# Patient Record
Sex: MALE | Race: WHITE | Employment: UNEMPLOYED | URBAN - METROPOLITAN AREA
[De-identification: names, ages, dates, MRNs, and addresses within clinical notes are randomized per-mention and may not be internally consistent; named-entity substitution may affect disease eponyms.]

---

## 2017-01-15 ENCOUNTER — HOSPITAL ENCOUNTER (EMERGENCY)
Facility: CLINIC | Age: 22
Discharge: HOME | End: 2017-01-15
Attending: FAMILY MEDICINE | Admitting: FAMILY MEDICINE
Payer: COMMERCIAL

## 2017-01-15 VITALS
OXYGEN SATURATION: 100 % | HEIGHT: 69 IN | SYSTOLIC BLOOD PRESSURE: 137 MMHG | TEMPERATURE: 99 F | HEART RATE: 93 BPM | DIASTOLIC BLOOD PRESSURE: 90 MMHG | BODY MASS INDEX: 26.66 KG/M2 | RESPIRATION RATE: 14 BRPM | WEIGHT: 180 LBS

## 2017-01-15 DIAGNOSIS — E10.65 TYPE 1 DIABETES MELLITUS WITH HYPERGLYCEMIA (H): ICD-10-CM

## 2017-01-15 LAB — GLUCOSE BLDC GLUCOMTR-MCNC: 381 MG/DL (ref 70–99)

## 2017-01-15 PROCEDURE — 99283 EMERGENCY DEPT VISIT LOW MDM: CPT | Performed by: FAMILY MEDICINE

## 2017-01-15 PROCEDURE — 99283 EMERGENCY DEPT VISIT LOW MDM: CPT

## 2017-01-15 PROCEDURE — 00000146 ZZHCL STATISTIC GLUCOSE BY METER IP

## 2017-01-15 ASSESSMENT — ENCOUNTER SYMPTOMS: FEVER: 0

## 2017-01-15 NOTE — ED AVS SNAPSHOT
[facility] Emergency Department    [address] [place] [address]    Phone:  [phone]    Fax:  [phone]                                       Joaquim Lange   MRN: [id number]    Department:  [facility] Emergency Department   Date of Visit:  1/15/2017           After Visit Summary Signature Page     I have received my discharge instructions, and my questions have been answered. I have discussed any challenges I see with this plan with the nurse or doctor.    ..........................................................................................................................................  Patient/Patient Representative Signature      ..........................................................................................................................................  Patient Representative Print Name and Relationship to Patient    ..................................................               ................................................  Date                                            Time    ..........................................................................................................................................  Reviewed by Signature/Title    ...................................................              ..............................................  Date                                                            Time

## 2017-01-15 NOTE — ED AVS SNAPSHOT
[facility] Emergency Department    [address]     [place] [address]    Phone:  [phone]    Fax:  [phone]                                       Joaquim Lange   MRN: [id number]    Department:  [facility] Emergency Department   Date of Visit:  1/15/2017           Patient Information     Date Of Birth          1995        Your diagnoses for this visit were:     Type 1 diabetes mellitus with hyperglycemia (H)        You were seen by Elia Thomason MD.      Follow-up Information     Follow up with Luis Parra DO.    Specialty:  Internal Medicine    Why:  1-2 weeks    Contact information:    [address]   [place] [address] [phone]          Discharge Instructions       Continue with your insulin as directed.  Recheck in clinic with Dr. Parra in the next 1-2 weeks.  Return to the ED if worse/concerns.  It was nice visiting with both of you this evening.  I'm glad you came in now rather than waiting until you got real sick.    Thank you for choosing [facility]. We appreciate the opportunity to meet your urgent medical needs. Please let us know if we could have done anything to make your stay more satisfying.    After discharge, please closely monitor for any new or worsening symptoms. Return to the Emergency Department if you develop any acute worsening signs or symptoms.    If you had lab work, cultures or imaging studies done during your stay, the final results may still be pending. We will call you if your plan of care needs to change. However, if you are not improving as expected, please follow up with your primary care provider or clinic.     Start any prescription medications that were prescribed to you and take them as directed.     Please see additional handouts that may be pertinent to your condition.        24 Hour Appointment Hotline       To make an appointment at any [facility] clinic, call  [phone] ([phone]). If you don't have a family doctor or clinic, we will help you find one. [facility] clinics are conveniently located to serve the needs of you and your family.             Review of your medicines      CONTINUE these medicines which may have CHANGED, or have new prescriptions. If we are uncertain of the size of tablets/capsules you have at home, strength may be listed as something that might have changed.        Dose / Directions Last dose taken    insulin aspart 100 UNIT/ML injection   Commonly known as:  NovoLOG FLEXPEN   What changed:  additional instructions   Quantity:  3 mL        1 Unit /50>150 BG, 1 Unit /10gm CHO.   Refills:  0        insulin glargine 100 UNIT/ML injection   Commonly known as:  LANTUS SOLOSTAR   Dose:  20 Units   What changed:    - how much to take  - how to take this  - when to take this  - additional instructions   Quantity:  3 mL        Inject 20 Units Subcutaneous At Bedtime   Refills:  0          Our records show that you are taking the medicines listed below. If these are incorrect, please call your family doctor or clinic.        Dose / Directions Last dose taken    atorvastatin 10 MG tablet   Commonly known as:  LIPITOR   Dose:  10 mg   Quantity:  90 tablet        Take 1 tablet (10 mg) by mouth daily   Refills:  1        blood glucose monitoring lancets   Quantity:  1 Box        Use to test blood sugar 3 times daily or as directed.   Refills:  prn        * blood glucose monitoring meter device kit   Quantity:  1 kit        Use to test blood sugars 2 times daily or as directed.   Refills:  0        * blood glucose monitoring meter device kit   Commonly known as:  no brand specified   Quantity:  1 kit        Use to test blood sugar 2 times daily or as directed. One Touch ultra meter and all supplies needed.   Refills:  0        blood glucose monitoring test strip   Commonly known as:  no brand specified   Quantity:  1 Box        Use to test blood sugars 3  "times daily or as directed   Refills:  5        lisinopril 5 MG tablet   Commonly known as:  PRINIVIL/ZESTRIL   Dose:  5 mg   Quantity:  90 tablet        Take 1 tablet (5 mg) by mouth daily   Refills:  1        order for DME   Quantity:  1 Device        Equipment being ordered:       Accu check ras plus meter and supplies(strips, sol'n, lancets)  check qid   Refills:  0        pen needles 1/2\" 29G X 12MM Misc   Dose:  1 Device   Quantity:  1 Box        1 Device 4 times daily   Refills:  5        * Notice:  This list has 2 medication(s) that are the same as other medications prescribed for you. Read the directions carefully, and ask your doctor or other care provider to review them with you.            Prescriptions were sent or printed at these locations (2 Prescriptions)                   [facility]   [address] [address]    Telephone:  [phone]   Fax:  [phone]   Hours:                  These medications are not ready yet because we are checking if your insurance will help you pay for them. Call your pharmacy to confirm that your medication is ready for pickup. It may take up to 24 hours for them to receive the prescription. If the prescription is not ready within 3 business days, please contact your clinic or your provider (2 of 2)         insulin aspart (NOVOLOG FLEXPEN) 100 UNIT/ML injection               insulin glargine (LANTUS SOLOSTAR) 100 UNIT/ML injection                Procedures and tests performed during your visit     Glucose by meter      Orders Needing Specimen Collection     None      Pending Results     No orders found from 1/14/2017 to 1/16/2017.            Pending Culture Results     No orders found from 1/14/2017 to 1/16/2017.            Thank you for choosing [facility]       Thank you for choosing [facility] for your care. Our goal is always to provide you with excellent care. Hearing back from our patients is one way we can continue to " "improve our services. Please take a few minutes to complete the written survey that you may receive in the mail after you visit with us. Thank you!        [employer] Information     [employer] lets you send messages to your doctor, view your test results, renew your prescriptions, schedule appointments and more. To sign up, go to [email or web address] . Click on \"Log in\" on the left side of the screen, which will take you to the Welcome page. Then click on \"Sign up Now\" on the right side of the page.     You will be asked to enter the access code listed below, as well as some personal information. Please follow the directions to create your username and password.     Your access code is: [email or web address]  Expires: 4/15/2017  8:58 PM     Your access code will  in 90 days. If you need help or a new code, please call your [facility] clinic or [phone].        Care EveryWhere ID     This is your Care EveryWhere ID. This could be used by other organizations to access your [facility] medical records  [id number]        After Visit Summary       This is your record. Keep this with you and show to your community pharmacist(s) and doctor(s) at your next visit.                  "

## 2017-01-16 NOTE — ED NOTES
Patient has been running high today but he held off eating until tonight so he wouldn't be too high. He is not having any symptoms of being in DKA but he is feeling high.

## 2017-01-16 NOTE — ED PROVIDER NOTES
History     Chief Complaint   Patient presents with     Medication Refill     The history is provided by the patient.     Joaquim Lange is a 21 year old male who presents to the ED with a medication refill. Patient is a diabetic and ran out of his Lantus last night and Novalog this morning. He has ran out because he is switching from [employer] insurance to his work insurance. His primary care provider is Dr. Parra.     He has been diabetic since age 6.  Takes Lantus 20 units at bedtime and NovoLog 1 unit / 50 greater than 150 BG and 1 unit per 10 g of carbohydrates.  He ran out of his Lantus last night and his NovoLog this morning.  Fortunately he came in now rather than waiting a few days and ending up in DKA.  He has not been ill as of late.  He feels like his blood sugars a bit high but otherwise feels fine.      Past Medical History   Diagnosis Date     Type I (juvenile type) diabetes mellitus without mention of complication, not stated as uncontrolled 07/03/02     sent down to [facility] for admission and evaluation       Past Surgical History   Procedure Laterality Date     No history of surgery         Social History     Social History     Marital Status: Single     Spouse Name: N/A     Number of Children: N/A     Years of Education: N/A     Occupational History     Not on file.     Social History Main Topics     Smoking status: Current Every Day Smoker -- 0.50 packs/day for 3 years     Types: Cigarettes     Smokeless tobacco: Never Used      Comment: started age 17     Alcohol Use: No     Drug Use: No     Sexual Activity: Not on file     Other Topics Concern     Not on file     Social History Narrative          Allergies   Allergen Reactions     No Known Drug Allergies        Med List Reviewed      I have reviewed the Medications, Allergies, Past Medical and Surgical History, and Social History in the Epic system.    Review of Systems   Constitutional: Negative for fever.   All other systems  "reviewed and are negative.      Physical Exam   BP: 137/90 mmHg  Pulse: 93  Temp: 99  F (37.2  C)  Resp: 14  Height: 175.3 cm (5' 9\")  Weight: 81.647 kg (180 lb)  SpO2: 100 %  Physical Exam   Constitutional: He is oriented to person, place, and time. He appears well-developed and well-nourished. No distress.   Pulmonary/Chest: Effort normal. No respiratory distress.   Neurological: He is alert and oriented to person, place, and time.   Psychiatric: He has a normal mood and affect.       ED Course    Finger stick blood glucose was 381.  He's missed a couple of doses of his NovoLog today and feels comfortable getting back on his insulin as he's done this for many years.  I refilled both the NovoLog and Lantus for him.  I asked him to follow up with Dr. Parra in the next 1-2 weeks.     Procedures          Results for orders placed or performed during the hospital encounter of 01/15/17 (from the past 24 hour(s))   Glucose by meter   Result Value Ref Range    Glucose 381 (H) 70 - 99 mg/dL     Medications - No data to display      Assessments & Plan (with Medical Decision Making)    (E10.65) Type 1 diabetes mellitus with hyperglycemia (H)  Comment:   Plan: insulin aspart (NOVOLOG FLEXPEN) 100 UNIT/ML         injection, insulin glargine (LANTUS SOLOSTAR)         100 UNIT/ML injection                I have reviewed the nursing notes.    I have reviewed the findings, diagnosis, plan and need for follow up with the patient.    New Prescriptions    No medications on file       Final diagnoses:   Type 1 diabetes mellitus with hyperglycemia (H)   This document serves as a record of services personally performed by Elia Thomason MD. It was created on their behalf by Sylvia Kwon, a trained medical scribe. The creation of this record is based on the provider's personal observations and the statements of the patient. This document has been checked and approved by the attending provider.   Note: Chart documentation " done in part with Dragon Voice Recognition software. Although reviewed after completion, some word and grammatical errors may remain.        1/15/2017   [facility] EMERGENCY DEPARTMENT      Elia Thomason MD  01/15/17 0446

## 2017-01-16 NOTE — ED NOTES
Pt is out of Lantus and Novalog because he went from [employer] to work insurance and there has been a lapse.

## 2017-01-16 NOTE — DISCHARGE INSTRUCTIONS
Continue with your insulin as directed.  Recheck in clinic with Dr. Parra in the next 1-2 weeks.  Return to the ED if worse/concerns.  It was nice visiting with both of you this evening.  I'm glad you came in now rather than waiting until you got real sick.    Thank you for choosing [facility]. We appreciate the opportunity to meet your urgent medical needs. Please let us know if we could have done anything to make your stay more satisfying.    After discharge, please closely monitor for any new or worsening symptoms. Return to the Emergency Department if you develop any acute worsening signs or symptoms.    If you had lab work, cultures or imaging studies done during your stay, the final results may still be pending. We will call you if your plan of care needs to change. However, if you are not improving as expected, please follow up with your primary care provider or clinic.     Start any prescription medications that were prescribed to you and take them as directed.     Please see additional handouts that may be pertinent to your condition.

## 2017-02-02 ENCOUNTER — HOSPITAL ENCOUNTER (EMERGENCY)
Facility: CLINIC | Age: 22
Discharge: HOME | End: 2017-02-02
Attending: PHYSICIAN ASSISTANT | Admitting: PHYSICIAN ASSISTANT
Payer: COMMERCIAL

## 2017-02-02 ENCOUNTER — OFFICE VISIT (OUTPATIENT)
Dept: FAMILY MEDICINE | Facility: CLINIC | Age: 22
End: 2017-02-02

## 2017-02-02 VITALS
RESPIRATION RATE: 16 BRPM | SYSTOLIC BLOOD PRESSURE: 143 MMHG | TEMPERATURE: 98.6 F | DIASTOLIC BLOOD PRESSURE: 88 MMHG | OXYGEN SATURATION: 98 %

## 2017-02-02 DIAGNOSIS — E10.65 TYPE 1 DIABETES MELLITUS WITH HYPERGLYCEMIA (H): ICD-10-CM

## 2017-02-02 DIAGNOSIS — Z53.9 ERRONEOUS ENCOUNTER--DISREGARD: Primary | ICD-10-CM

## 2017-02-02 PROCEDURE — 99283 EMERGENCY DEPT VISIT LOW MDM: CPT | Performed by: PHYSICIAN ASSISTANT

## 2017-02-02 PROCEDURE — 00000146 ZZHCL STATISTIC GLUCOSE BY METER IP

## 2017-02-02 PROCEDURE — 99283 EMERGENCY DEPT VISIT LOW MDM: CPT

## 2017-02-02 NOTE — ED AVS SNAPSHOT
[facility] Emergency Department    [address] [place] [address]    Phone:  [phone]    Fax:  [phone]                                       Joaquim Lange   MRN: [id number]    Department:  [facility] Emergency Department   Date of Visit:  2/2/2017           After Visit Summary Signature Page     I have received my discharge instructions, and my questions have been answered. I have discussed any challenges I see with this plan with the nurse or doctor.    ..........................................................................................................................................  Patient/Patient Representative Signature      ..........................................................................................................................................  Patient Representative Print Name and Relationship to Patient    ..................................................               ................................................  Date                                            Time    ..........................................................................................................................................  Reviewed by Signature/Title    ...................................................              ..............................................  Date                                                            Time

## 2017-02-02 NOTE — ED AVS SNAPSHOT
[facility] Emergency Department    [address] [place] [address]    Phone:  [phone]    Fax:  [phone]                                       Joaquim Lange   MRN: [id number]    Department:  [facility] Emergency Department   Date of Visit:  2/2/2017           Patient Information     Date Of Birth          1995        Your diagnoses for this visit were:     Type 1 diabetes mellitus with hyperglycemia (H)        You were seen by Yobani Fernandez PA-C.        Discharge Instructions       1.  Please keep your appointment tomorrow with Dr. Parra!!  This is absolutely necessary!!    It was a pleasure meeting you this evening.      24 Hour Appointment Hotline       To make an appointment at any [facility] clinic, call [phone] ([phone]). If you don't have a family doctor or clinic, we will help you find one. [facility] clinics are conveniently located to serve the needs of you and your family.             Review of your medicines      START taking        Dose / Directions Last dose taken    insulin lispro 100 UNIT/ML injection   Commonly known as:  HumaLOG KWIKpen   Quantity:  3 mL   Replaces:  insulin aspart 100 UNIT/ML injection        1 Unit /50>150 BG, 1 Unit /10gm CHO.   Refills:  0          Our records show that you are taking the medicines listed below. If these are incorrect, please call your family doctor or clinic.        Dose / Directions Last dose taken    blood glucose monitoring lancets   Quantity:  1 Box        Use to test blood sugar 3 times daily or as directed.   Refills:  prn        * blood glucose monitoring meter device kit   Quantity:  1 kit        Use to test blood sugars 2 times daily or as directed.   Refills:  0        * blood glucose monitoring meter device kit   Commonly known as:  no brand specified   Quantity:  1 kit        Use to test blood sugar 2 times daily or as directed. One Touch ultra meter and all supplies needed.   Refills:   "0        blood glucose monitoring test strip   Commonly known as:  no brand specified   Quantity:  1 Box        Use to test blood sugars 3 times daily or as directed   Refills:  5        insulin glargine 100 UNIT/ML injection   Commonly known as:  LANTUS SOLOSTAR   Dose:  20 Units   Quantity:  3 mL        Inject 20 Units Subcutaneous At Bedtime   Refills:  0        order for DME   Quantity:  1 Device        Equipment being ordered:       Accu check ras plus meter and supplies(strips, sol'n, lancets)  check qid   Refills:  0        pen needles 1/2\" 29G X 12MM Misc   Dose:  1 Device   Quantity:  1 Box        1 Device 4 times daily   Refills:  5        * Notice:  This list has 2 medication(s) that are the same as other medications prescribed for you. Read the directions carefully, and ask your doctor or other care provider to review them with you.      STOP taking        Dose Reason for stopping Comments    insulin aspart 100 UNIT/ML injection   Commonly known as:  NovoLOG FLEXPEN   Replaced by:  insulin lispro 100 UNIT/ML injection                      Prescriptions were sent or printed at these locations (2 Prescriptions)                   [facility] [address]    [address] [facility] [place] [address]    Telephone:  [phone]   Fax:  [phone]   Hours:                  E-Prescribed (2 of 2)         insulin lispro (HUMALOG KWIKPEN) 100 UNIT/ML injection               insulin glargine (LANTUS SOLOSTAR) 100 UNIT/ML injection                Orders Needing Specimen Collection     None      Pending Results     No orders found from 2/1/2017 to 2/3/2017.            Pending Culture Results     No orders found from 2/1/2017 to 2/3/2017.            Thank you for choosing [facility]       Thank you for choosing [facility] for your care. Our goal is always to provide you with excellent care. Hearing back from our patients is one way we can continue to improve our services. Please take a " "few minutes to complete the written survey that you may receive in the mail after you visit with us. Thank you!        [employer] Information     [employer] lets you send messages to your doctor, view your test results, renew your prescriptions, schedule appointments and more. To sign up, go to [email or web address] . Click on \"Log in\" on the left side of the screen, which will take you to the Welcome page. Then click on \"Sign up Now\" on the right side of the page.     You will be asked to enter the access code listed below, as well as some personal information. Please follow the directions to create your username and password.     Your access code is: [email or web address]  Expires: 4/15/2017  8:58 PM     Your access code will  in 90 days. If you need help or a new code, please call your [facility] clinic or [phone].        Care EveryWhere ID     This is your Care EveryWhere ID. This could be used by other organizations to access your [facility] medical records  [id number]        After Visit Summary       This is your record. Keep this with you and show to your community pharmacist(s) and doctor(s) at your next visit.                  "

## 2017-02-03 NOTE — DISCHARGE INSTRUCTIONS
1.  Please keep your appointment tomorrow with Dr. Parra!!  This is absolutely necessary!!    It was a pleasure meeting you this evening.

## 2017-02-03 NOTE — ED PROVIDER NOTES
History     Chief Complaint   Patient presents with     Blood Sugar Problem     The history is provided by the patient.     Joaquim Lange is a 21 year old male with Type I diabetes who presents to the emergency department with a blood sugar problem. Patient has had Type I diabetes since he was 6 years old. He states that he does not have insulin at home currently and needs more. He says he recently acquired insurance through work and made a clinic visit today to receive more insulin. Patient went to the clinic but when he got there he was told he cannot be seen because there was a problem with scheduling and the provider was over booked and needed to run labs in order to prescribe him insulin. The patient then came to the ED to receive insulin. He says he has not had insurance for a year and had a family member providing insulin for him over the year. He says his blood sugar levels have been around the 200's. At 1400 today, it was 220 and at 1810 in the ED it was 243. He states that if he would guess his A1C it would be 9. Patient denies vomiting. He notes he has a clinic appointment scheduled for tomorrow that he states he will be going to since he now has insurance.    I have reviewed the Medications, Allergies, Past Medical and Surgical History, and Social History in the Epic system.      Patient Active Problem List   Diagnosis     Type 1 diabetes mellitus with hyperglycemia (H)     CARDIOVASCULAR SCREENING; LDL GOAL LESS THAN 100     Past Medical History   Diagnosis Date     Type I (juvenile type) diabetes mellitus without mention of complication, not stated as uncontrolled 07/03/02     sent down to [facility] for admission and evaluation       Past Surgical History   Procedure Laterality Date     No history of surgery         Family History   Problem Relation Age of Onset     DIABETES Father      Type I since childhood     DIABETES Maternal Aunt      Type II       Social History   Substance Use  "Topics     Smoking status: Current Every Day Smoker -- 0.75 packs/day for 3 years     Types: Cigarettes     Smokeless tobacco: Never Used      Comment: started age 17     Alcohol Use: No        Immunization History   Administered Date(s) Administered     DTAP (<7y) 08/29/2001     HIB 05/02/1997     Hepatitis B 1995, 1996, 02/12/1997     IPV 08/29/2001     MMR 08/29/2001     OPV 1996, 1996, 1996     Tetramune (DtP/HIB) 1996, 1996, 1996     Varicella 05/02/1997          Allergies   Allergen Reactions     No Known Drug Allergies        Current Outpatient Prescriptions   Medication Sig Dispense Refill     insulin glargine (LANTUS SOLOSTAR) 100 UNIT/ML injection Inject 20 Units Subcutaneous At Bedtime 3 mL 0     insulin aspart (NOVOLOG FLEXPEN) 100 UNIT/ML injection 1 Unit /50>150 BG, 1 Unit /10gm CHO. 3 mL 0     [DISCONTINUED] insulin lispro (HUMALOG KWIKPEN) 100 UNIT/ML injection 1 Unit /50>150 BG, 1 Unit /10gm CHO. 3 mL 0     blood glucose monitoring (ACCU-CHEK MULTICLIX) lancets Use to test blood sugar 3 times daily or as directed. 1 Box prn     blood glucose monitoring (NO BRAND SPECIFIED) test strip Use to test blood sugars 3 times daily or as directed 1 Box 5     order for DME Equipment being ordered:       Accu check ras plus meter and supplies(strips, sol'n, lancets)   check qid 1 Device 0     blood glucose monitoring (NO BRAND SPECIFIED) meter device kit Use to test blood sugar 2 times daily or as directed. One Touch ultra meter and all supplies needed. 1 kit 0     Insulin Pen Needle (PEN NEEDLES 1/2\") 29G X 12MM MISC 1 Device 4 times daily 1 Box 5     blood glucose monitoring (JIA CONTOUR MONITOR) meter device kit Use to test blood sugars 2 times daily or as directed. 1 kit 0     Review of Systems   All other systems reviewed and are negative.      Physical Exam   BP: 143/88 mmHg  Heart Rate: 79  Temp: 98.6  F (37  C)  Resp: 16  SpO2: 98 %  Physical Exam "   Constitutional: He is oriented to person, place, and time. He appears well-developed and well-nourished.   HENT:   Head: Normocephalic and atraumatic.   Eyes: Conjunctivae and EOM are normal.   Neck: Normal range of motion.   Musculoskeletal: Normal range of motion.   Neurological: He is alert and oriented to person, place, and time.   Skin: Skin is warm and dry.   Psychiatric: He has a normal mood and affect. His behavior is normal.   Nursing note and vitals reviewed.      ED Course   Procedures             Critical Care time:  none             No results found for this or any previous visit (from the past 24 hour(s)).    Medications - No data to display    Assessments & Plan (with Medical Decision Making)  Type 1 diabetes mellitus with hyperglycemia (H)       21 year old male with a history of type 1 diabetes since 6 years old presents for evaluation of running out of his home insulin medication. He states that he has not had insurance for over 1.5 years, and therefore has not been able to see his regular primary care physician for follow-up of his diabetes condition. He has been using insulin from an old prescription, and also from a friend to help manage his condition. He states his blood sugars are averaging below 200 range, and he thinks that his hemoglobin A1c would be in the 9 range. Blood sugar in the ED was 243. She has no history or exam findings suggestive of DKA. The patient has a scheduled appointment with his primary care physician tomorrow for full evaluation and laboratory workup. I think it is appropriate to provide him with his 2 insulin prescriptions to make it to that appointment. She was very grateful for this. Initially, we sent the prescriptions to the clinic pharmacy, but they are only able to dispense the medication in 5 insulin pen boxes, which would be close to a 3 month supply. Therefore, we changed this prescription to the inpatient pharmacy as they are able to dispense just one pen  at a time. I wanted to make sure that the patient followed up with primary care to get the appropriate laboratory monitoring performed given that he is long overdue for this. Patient did agree to follow-up the following day as scheduled. We discussed that she cannot continue to get refills of his insulin in the ED setting, and they does need to reestablish his care with primary care again. The patient was in agreement with this plan and was suitable for discharge.      I have reviewed the nursing notes.    I have reviewed the findings, diagnosis, plan and need for follow up with the patient.    Discharge Medication List as of 2/2/2017  6:39 PM      START taking these medications    Details   insulin lispro (HUMALOG KWIKPEN) 100 UNIT/ML injection 1 Unit /50>150 BG, 1 Unit /10gm CHO., Disp-3 mL, R-0, E-Prescribe             Final diagnoses:   Type 1 diabetes mellitus with hyperglycemia (H)   This document serves as a record of services personally performed by Yobani Fernandez PA*. It was created on their behalf by Carla Lemus, a trained medical scribe. The creation of this record is based on the provider's personal observations and the statements of the patient. This document has been checked and approved by the attending provider.     Note: Chart documentation done in part with Dragon Voice Recognition software. Although reviewed after completion, some word and grammatical errors may remain.    2/2/2017   Yobani Fernandez PA-C   [facility] EMERGENCY DEPARTMENT      Yobani Fernandez PA-C  02/03/17 0023

## 2017-02-10 DIAGNOSIS — E10.65 TYPE 1 DIABETES MELLITUS WITH HYPERGLYCEMIA (H): Primary | ICD-10-CM

## 2017-02-10 NOTE — TELEPHONE ENCOUNTER
Patient called as he is out of his insulin and needs some today. Per Dr. Wiggins ok to refill insulin until he can get in to follow up. Patient scheduled with Dr. Parra and is aware his appointment is in [place]. JUNG/MA

## 2017-02-13 ENCOUNTER — DOCUMENTATION ONLY (OUTPATIENT)
Dept: FAMILY MEDICINE | Facility: CLINIC | Age: 22
End: 2017-02-13

## 2017-02-16 LAB — GLUCOSE BLDC GLUCOMTR-MCNC: 243 MG/DL (ref 70–99)

## 2017-02-17 ENCOUNTER — OFFICE VISIT (OUTPATIENT)
Dept: FAMILY MEDICINE | Facility: OTHER | Age: 22
End: 2017-02-17
Payer: COMMERCIAL

## 2017-02-17 VITALS
BODY MASS INDEX: 25.1 KG/M2 | WEIGHT: 170 LBS | SYSTOLIC BLOOD PRESSURE: 122 MMHG | HEART RATE: 99 BPM | OXYGEN SATURATION: 100 % | DIASTOLIC BLOOD PRESSURE: 82 MMHG | TEMPERATURE: 98.2 F

## 2017-02-17 DIAGNOSIS — E10.65 TYPE 1 DIABETES MELLITUS WITH HYPERGLYCEMIA (H): Primary | ICD-10-CM

## 2017-02-17 DIAGNOSIS — B07.0 PLANTAR WARTS: ICD-10-CM

## 2017-02-17 LAB
ANION GAP SERPL CALCULATED.3IONS-SCNC: 7 MMOL/L (ref 3–14)
BUN SERPL-MCNC: 18 MG/DL (ref 7–30)
CALCIUM SERPL-MCNC: 9.2 MG/DL (ref 8.5–10.1)
CHLORIDE SERPL-SCNC: 101 MMOL/L (ref 94–109)
CHOLEST SERPL-MCNC: 141 MG/DL
CO2 SERPL-SCNC: 31 MMOL/L (ref 20–32)
CREAT SERPL-MCNC: 0.67 MG/DL (ref 0.66–1.25)
CREAT UR-MCNC: 28 MG/DL
GFR SERPL CREATININE-BSD FRML MDRD: ABNORMAL ML/MIN/1.7M2
GLUCOSE SERPL-MCNC: 373 MG/DL (ref 70–99)
HBA1C MFR BLD: 10.9 % (ref 4.3–6)
HDLC SERPL-MCNC: 56 MG/DL
LDLC SERPL CALC-MCNC: 58 MG/DL
MICROALBUMIN UR-MCNC: <5 MG/L
MICROALBUMIN/CREAT UR: NORMAL MG/G CR (ref 0–17)
NONHDLC SERPL-MCNC: 85 MG/DL
POTASSIUM SERPL-SCNC: 4.3 MMOL/L (ref 3.4–5.3)
SODIUM SERPL-SCNC: 139 MMOL/L (ref 133–144)
TRIGL SERPL-MCNC: 134 MG/DL

## 2017-02-17 PROCEDURE — 99214 OFFICE O/P EST MOD 30 MIN: CPT | Performed by: INTERNAL MEDICINE

## 2017-02-17 PROCEDURE — 80061 LIPID PANEL: CPT | Performed by: INTERNAL MEDICINE

## 2017-02-17 PROCEDURE — 99207 C FOOT EXAM  NO CHARGE: CPT | Performed by: INTERNAL MEDICINE

## 2017-02-17 PROCEDURE — 82043 UR ALBUMIN QUANTITATIVE: CPT | Performed by: INTERNAL MEDICINE

## 2017-02-17 PROCEDURE — 80048 BASIC METABOLIC PNL TOTAL CA: CPT | Performed by: INTERNAL MEDICINE

## 2017-02-17 PROCEDURE — 83036 HEMOGLOBIN GLYCOSYLATED A1C: CPT | Performed by: INTERNAL MEDICINE

## 2017-02-17 PROCEDURE — 36415 COLL VENOUS BLD VENIPUNCTURE: CPT | Performed by: INTERNAL MEDICINE

## 2017-02-17 RX ORDER — INSULIN ASPART 100 [IU]/ML
INJECTION, SOLUTION INTRAVENOUS; SUBCUTANEOUS
COMMUNITY
Start: 2017-02-02 | End: 2017-02-17

## 2017-02-17 RX ORDER — INSULIN GLARGINE 100 [IU]/ML
20 INJECTION, SOLUTION SUBCUTANEOUS AT BEDTIME
Qty: 20 ML | Refills: 1 | Status: SHIPPED | OUTPATIENT
Start: 2017-02-17 | End: 2017-03-10

## 2017-02-17 RX ORDER — INSULIN ASPART 100 [IU]/ML
60 INJECTION, SOLUTION INTRAVENOUS; SUBCUTANEOUS DAILY
Qty: 15 ML | Refills: 3 | Status: SHIPPED | OUTPATIENT
Start: 2017-02-17 | End: 2017-02-17

## 2017-02-17 ASSESSMENT — PAIN SCALES - GENERAL: PAINLEVEL: SEVERE PAIN (7)

## 2017-02-17 NOTE — PROGRESS NOTES
SUBJECTIVE:                                                    Joaquim Lange is a 21 year old male who presents to clinic today for the following health issues:    Diabetes Follow-up    Patient is checking blood sugars: four times daily.    Blood sugar testing frequency justification: Uncontrolled diabetes  Results are as follows:         150-220    Diabetic concerns: None     Symptoms of hypoglycemia (low blood sugar): none     Paresthesias (numbness or burning in feet) or sores: No     Date of last diabetic eye exam: none       Amount of exercise or physical activity: 4-5 days/week for an average of 30-45 minutes    Problems taking medications regularly: No    Medication side effects: none  Diet: carbohydrate counting      Problem list and histories reviewed & adjusted, as indicated.  Additional history: none      CHIEF COMPLAINT:    The patient is a pleasant 21-year-old male who presents today for follow-up on his type 1 diabetes. He was last evaluated in November 2015. He notes that he has been busy. He is running out of insulin. He notes his blood sugars have not been controlled. Values are generally around 200-220. He does not take an ACE inhibitor or a statin at this point. He is refusing. Additionally, he has a wart on the bottom of his foot and would like referral to podiatry. With respect to his diabetes, denies any ulceration, skin breakdown, neuropathy, visual changes, or symptoms of uncontrolled hyperglycemia.                         PAST, FAMILY,SOCIAL HISTORY:     Medical  History:   has a past medical history of Type I (juvenile type) diabetes mellitus without mention of complication, not stated as uncontrolled (07/03/02).     Surgical History:   has a past surgical history that includes no history of surgery.     Social History:   reports that he has been smoking Cigarettes.  He has a 2.25 pack-year smoking history. He has never used smokeless tobacco. He reports that he does not drink alcohol  "or use illicit drugs.     Family History:  family history includes DIABETES in his father and maternal aunt.            MEDICATIONS  Current Outpatient Prescriptions   Medication Sig Dispense Refill     insulin glargine (LANTUS VIAL) 100 UNIT/ML injection Inject 20 Units Subcutaneous At Bedtime 20 mL 1     Insulin Pen Needle (PEN NEEDLES 1/2\") 29G X 12MM MISC 1 Device 4 times daily 100 each 3     insulin lispro (HUMALOG KWIKPEN) 100 UNIT/ML injection 60 units per day max in divided doses per sliding scale 30 mL 1     blood glucose monitoring (ACCU-CHEK MULTICLIX) lancets Use to test blood sugar 3 times daily or as directed. 1 Box prn     blood glucose monitoring (NO BRAND SPECIFIED) test strip Use to test blood sugars 3 times daily or as directed 1 Box 5     order for DME Equipment being ordered:       Accu check ras plus meter and supplies(strips, sol'n, lancets)   check qid 1 Device 0     blood glucose monitoring (NO BRAND SPECIFIED) meter device kit Use to test blood sugar 2 times daily or as directed. One Touch ultra meter and all supplies needed. 1 kit 0     blood glucose monitoring (nanoRETE CONTOUR MONITOR) meter device kit Use to test blood sugars 2 times daily or as directed. 1 kit 0         --------------------------------------------------------------------------------------------------------------------                          REVIEW OF SYSTEMS:         LUNGS: Pt denies: cough,excess sputum, hemoptysis, or shortness of breath.   HEART: Pt denies: chest pain, arrythmia, syncope, tachy or bradyarrhythmia or excess edema.   GI: Pt denies: nausea, vomitting, diarrhea, constipation, melena, or hematochezia.   NEURO: Pt denies: seizures, strokes, diplopia, weakness, paraesthesias, or paralysis.   SKIN: Pt denies: itching, rashes, discoloration, or additional lesions of concern. Denies recent hair loss. He does have a plantar wart on his foot.   PSYCH: The patient denies significant depression, anxiety, mood " "imbalance. Specifically denies any suicidal ideation.                          EXAMINATION:         /82 (BP Location: Left arm, Patient Position: Chair, Cuff Size: Adult Large)  Pulse 99  Temp 98.2  F (36.8  C) (Temporal)  Wt 170 lb (77.1 kg)  SpO2 100%  BMI 25.1 kg/m2   Constitutional: The patient appears to be in no acute distress. The patient appears to be adequately hydrated. No acute respiratory or hemodynamic distress is noted at this time.   LUNGS: clear bilaterally, airflow is brisk, no intercostal retraction or stridor is noted. No coughing is noted during visit.   HEART:  regular without rubs, clicks, gallops, or murmurs. PMI is nondisplaced. Upstrokes are brisk. S1,S2 are heard.   GI: Abdomen is soft, without rebound, guarding or tenderness. Bowel sounds are appropriate. No renal bruits are heard.    NEURO: Pt is alert and appropriate. No neurologic lateralization is noted. Cranial nerves 2-12 are intact. Peripheral sensory and motor function are grossly normal   SKIN:  warm and dry. No erythema, or rashes are noted. No specific lesions of concern are noted.   A noninfected/noninflamed plantar wart with a significantly deep \"work\". It is noted on the plantar aspect of his foot. It is on the right foot and at the lateral midfoot.   PSYCH: The patient appears grossly appropriate. Maintains good eye contact, does not have any jittery or atypical motion. Displays appropriate affect.                        DECISION MAKIN. Type 1 diabetes mellitus with hyperglycemia (H)  Discussed compliance,  Discussed statins and ACE inhibitor's,  Discussed baby aspirin daily,  Discussed compliance again.      - insulin glargine (LANTUS VIAL) 100 UNIT/ML injection; Inject 20 Units Subcutaneous At Bedtime  Dispense: 20 mL; Refill: 1  - Hemoglobin A1c  - Basic metabolic panel  - Lipid Profile  - Albumin Random Urine Quantitative  - PODIATRY/FOOT & ANKLE SURGERY REFERRAL  - Insulin Pen Needle (PEN NEEDLES /\") 29G " X 12MM MISC; 1 Device 4 times daily  Dispense: 100 each; Refill: 3  - insulin lispro (HUMALOG KWIKPEN) 100 UNIT/ML injection; 60 units per day max in divided doses per sliding scale  Dispense: 30 mL; Refill: 1    2. Plantar warts    - FOOT EXAM  - PODIATRY/FOOT & ANKLE SURGERY REFERRAL                           FOLLOW UP    I have asked the patient to make an appointment for follow up with me in 2 weeks to review the results and discuss treatment options for his diabetic control.    I have carefully explained the diagnosis and treatment options with the patient. The patient has displayed an understanding of the above, and all subsequent questions were answered.       DO ZAFAR Guerra    Portions of this note were produced using [employer]  Although every attempt at real-time proof reading has been made, occasional grammar/syntax errors may have been missed.

## 2017-02-17 NOTE — NURSING NOTE
"Chief Complaint   Patient presents with     Diabetes       Initial /82 (BP Location: Left arm, Patient Position: Chair, Cuff Size: Adult Large)  Pulse 99  Temp 98.2  F (36.8  C) (Temporal)  Wt 170 lb (77.1 kg)  SpO2 100%  BMI 25.1 kg/m2 Estimated body mass index is 25.1 kg/(m^2) as calculated from the following:    Height as of 1/15/17: 5' 9\" (1.753 m).    Weight as of this encounter: 170 lb (77.1 kg).  Medication Reconciliation: complete   Mera IRENEA    "

## 2017-02-17 NOTE — MR AVS SNAPSHOT
After Visit Summary   2/17/2017    Joaquim Lange    MRN: [id number]           Patient Information     Date Of Birth          1995        Visit Information        Provider Department      2/17/2017 1:40 PM Luis Parra DO [facility]        Today's Diagnoses     Type 1 diabetes mellitus with hyperglycemia (H)    -  1    Plantar warts           Follow-ups after your visit        Additional Services     PODIATRY/FOOT & ANKLE SURGERY REFERRAL       Your provider has referred you to: [facility]: [facility] [phone]   [email or web address]    Please be aware that coverage of these services is subject to the terms and limitations of your health insurance plan.  Call member services at your health plan with any benefit or coverage questions.      Please bring the following to your appointment:  >>   Any x-rays, CTs or MRIs which have been performed.  Contact the facility where they were done to arrange for  prior to your scheduled appointment.    >>   List of current medications   >>   This referral request   >>   Any documents/labs given to you for this referral                  Your next 10 appointments already scheduled     Mar 03, 2017 11:30 AM CST   [facility]    [address] [address] [phone]              Who to contact     If you have questions or need follow up information about today's clinic visit or your schedule please contact [facility] directly at [phone].  Normal or non-critical lab and imaging results will be communicated to you by MyChart, letter or phone within 4 business days after the clinic has received the results. If you do not hear from us within 7 days, please contact the clinic through MyChart or phone. If you have a critical or abnormal lab result, we will notify you by phone  "as soon as possible.  Submit refill requests through [employer] or call your pharmacy and they will forward the refill request to us. Please allow 3 business days for your refill to be completed.          Additional Information About Your Visit        [employer] Information     [employer] lets you send messages to your doctor, view your test results, renew your prescriptions, schedule appointments and more. To sign up, go to [email or web address] . Click on \"Log in\" on the left side of the screen, which will take you to the Welcome page. Then click on \"Sign up Now\" on the right side of the page.     You will be asked to enter the access code listed below, as well as some personal information. Please follow the directions to create your username and password.     Your access code is: [email or web address]  Expires: 4/15/2017  8:58 PM     Your access code will  in 90 days. If you need help or a new code, please call your [place] clinic or [phone].        Care EveryWhere ID     This is your Care EveryWhere ID. This could be used by other organizations to access your [place] medical records  [id number]        Your Vitals Were     Pulse Temperature Pulse Oximetry BMI (Body Mass Index)          99 98.2  F (36.8  C) (Temporal) 100% 25.1 kg/m2         Blood Pressure from Last 3 Encounters:   17 122/82   17 143/88   01/15/17 137/90    Weight from Last 3 Encounters:   17 170 lb (77.1 kg)   01/15/17 180 lb (81.6 kg)   11/04/15 180 lb 9.6 oz (81.9 kg) (82 %)*     * Growth percentiles are based on CDC 2-20 Years data.              We Performed the Following     Albumin Random Urine Quantitative     Basic metabolic panel     FOOT EXAM     Hemoglobin A1c     Lipid Profile     PODIATRY/FOOT & ANKLE SURGERY REFERRAL          Today's Medication Changes          These changes are accurate as of: 17  2:37 PM.  If you have any questions, ask your nurse or doctor.               Start taking these medicines.        " "Dose/Directions    insulin glargine 100 UNIT/ML injection   Commonly known as:  LANTUS VIAL   Used for:  Type 1 diabetes mellitus with hyperglycemia (H)   Started by:  Luis Parra DO        Dose:  20 Units   Inject 20 Units Subcutaneous At Bedtime   Quantity:  20 mL   Refills:  1       insulin lispro 100 UNIT/ML injection   Commonly known as:  HumaLOG KWIKpen   Used for:  Type 1 diabetes mellitus with hyperglycemia (H)   Started by:  Luis Parra DO        60 units per day max in divided doses per sliding scale   Quantity:  30 mL   Refills:  1         Stop taking these medicines if you haven't already. Please contact your care team if you have questions.     NovoLOG FLEXPEN 100 UNIT/ML injection   Generic drug:  insulin aspart   Stopped by:  Luis Parra DO                Where to get your medicines      These medications were sent to [facility] [place] [id number] [facility] [address]     Phone:  [phone]     insulin glargine 100 UNIT/ML injection    insulin lispro 100 UNIT/ML injection    pen needles 1/2\" 29G X 12MM Misc                Primary Care Provider Office Phone # Fax #    Luis Alden Parra [phone]       [address] [place] [address]        Thank you!     Thank you for choosing [facility]  for your care. Our goal is always to provide you with excellent care. Hearing back from our patients is one way we can continue to improve our services. Please take a few minutes to complete the written survey that you may receive in the mail after your visit with us. Thank you!             Your Updated Medication List - Protect others around you: Learn how to safely use, store and throw away your medicines at www.disposemymeds.org.          This list is accurate as of: 2/17/17  2:37 PM.  Always use your most recent med list.                   Brand Name " "Dispense Instructions for use    blood glucose monitoring lancets     1 Box    Use to test blood sugar 3 times daily or as directed.       * blood glucose monitoring meter device kit     1 kit    Use to test blood sugars 2 times daily or as directed.       * blood glucose monitoring meter device kit    no brand specified    1 kit    Use to test blood sugar 2 times daily or as directed. One Touch ultra meter and all supplies needed.       blood glucose monitoring test strip    no brand specified    1 Box    Use to test blood sugars 3 times daily or as directed       insulin glargine 100 UNIT/ML injection    LANTUS VIAL    20 mL    Inject 20 Units Subcutaneous At Bedtime       insulin lispro 100 UNIT/ML injection    HumaLOG KWIKpen    30 mL    60 units per day max in divided doses per sliding scale       order for DME     1 Device    Equipment being ordered:       Accu check ras plus meter and supplies(strips, sol'n, lancets)  check qid       pen needles 1/2\" 29G X 12MM Misc     100 each    1 Device 4 times daily       * Notice:  This list has 2 medication(s) that are the same as other medications prescribed for you. Read the directions carefully, and ask your doctor or other care provider to review them with you.      "

## 2017-02-24 ENCOUNTER — TELEPHONE (OUTPATIENT)
Dept: INTERNAL MEDICINE | Facility: CLINIC | Age: 22
End: 2017-02-24

## 2017-02-24 NOTE — TELEPHONE ENCOUNTER
I spoke to the patient and gave him the providers message.  He asked to be called back at 12pm to schedule an apt.  I will call him back at that time.  Tim Cruz MA

## 2017-02-24 NOTE — PROGRESS NOTES
Please contact the patient and notify him of the following:  The blood sugar was markedly elevated at 373.  The glycosylated hemoglobin was 10.9.  This is pretty terrible.  He should set up a follow-up appointment so we can discuss how to improve his Blood sugar control.          Thank you.  DO ZAFAR Guerra

## 2017-03-03 ENCOUNTER — TELEPHONE (OUTPATIENT)
Dept: INTERNAL MEDICINE | Facility: CLINIC | Age: 22
End: 2017-03-03

## 2017-03-03 ENCOUNTER — OFFICE VISIT (OUTPATIENT)
Dept: PODIATRY | Facility: CLINIC | Age: 22
End: 2017-03-03
Payer: COMMERCIAL

## 2017-03-03 VITALS — TEMPERATURE: 98 F | WEIGHT: 170 LBS | BODY MASS INDEX: 25.18 KG/M2 | HEIGHT: 69 IN

## 2017-03-03 DIAGNOSIS — B07.0 PLANTAR WART: Primary | ICD-10-CM

## 2017-03-03 DIAGNOSIS — E10.65 TYPE 1 DIABETES MELLITUS WITH HYPERGLYCEMIA (H): Primary | ICD-10-CM

## 2017-03-03 PROCEDURE — 17110 DESTRUCTION B9 LES UP TO 14: CPT | Performed by: PODIATRIST

## 2017-03-03 PROCEDURE — 99203 OFFICE O/P NEW LOW 30 MIN: CPT | Mod: 25 | Performed by: PODIATRIST

## 2017-03-03 RX ORDER — HYDROCODONE BITARTRATE AND ACETAMINOPHEN 5; 325 MG/1; MG/1
1 TABLET ORAL EVERY 6 HOURS PRN
Qty: 20 TABLET | Refills: 0 | Status: SHIPPED | OUTPATIENT
Start: 2017-03-03 | End: 2017-03-06

## 2017-03-03 NOTE — TELEPHONE ENCOUNTER
Pt called the FC to talk with me about changing his insurance due to [employer] not covering his insulin meds, which he has to pay $700 a month for. Unfortunately he does not qualify for any health insurance that I can help him with. He said that Dr. Parra tried to prescribe him another RX instead but it was still very expensive. Pt would like to know if a PA could be done for this RX? I also gave pt [facility] number to call them to discuss. Please call and discuss ASAP as he needs to get his RX still. Thank you!

## 2017-03-03 NOTE — NURSING NOTE
"Chief Complaint   Patient presents with     Consult     wart on left foot       Initial Temp 98  F (36.7  C)  Ht 5' 9\" (1.753 m)  Wt 170 lb (77.1 kg)  BMI 25.1 kg/m2 Estimated body mass index is 25.1 kg/(m^2) as calculated from the following:    Height as of this encounter: 5' 9\" (1.753 m).    Weight as of this encounter: 170 lb (77.1 kg).  Medication Reconciliation: complete    BP completed using cuff size: NA (Not Taken)    Sakina Perkins MA      "

## 2017-03-03 NOTE — PATIENT INSTRUCTIONS
Wart treatment    1.  After bathing or soaking for 5 minutes, remove any loose or dead skin with a fingernail clipper, pumice stone, emery board, or 120 grit sandpaper.      2.  Place one drop of salicylic acid liquid or a patch on each wart. Try to apply this medication about 2 mm beyond the lesion and try not to get the medicine on the surrounding skin.     3.  Cover the treated area with strong tape to keep the medicine in place. You can use athletic tape, a Band-Aid, or duct tape.  Keep this in place all day.  If you skip a couple days, you will lose the benefit of the previous several days of treatment.     4.  Every few days remove the dead skin.  Reapply the salicylic acid each day, keeping it on place all day.     5.  If you have severe or intolerable skin irritation, skip a day between treatments. Then start treatment again. However, skipping treatments will slow down how quickly warts will go away.    6.  To permanently get rid of plantar warts will require 3-12 weeks of constant treatment and irritation.      7.  To move treatment along faster, but also more painful, you may apply over the counter liquid nitrogen to the wart once weekly and do not apply salicylic acid that day, then resume as above.  If a blister forms 1-2 days after the liquid nitrogen application, pop the blister and apply a bandaid. Then reapply the salicylic acid the next day.     8.  Follow up as instructed.  If you develop complications, you must be seen in clinic to evaluate and treat the complication.

## 2017-03-03 NOTE — MR AVS SNAPSHOT
After Visit Summary   3/3/2017    Joaquim Lange    MRN: [id number]           Patient Information     Date Of Birth          1995        Visit Information        Provider Department      3/3/2017 11:30 AM King Eduardo DPM [facility]        Today's Diagnoses     Plantar wart    -  1      Care Instructions    Wart treatment    1.  After bathing or soaking for 5 minutes, remove any loose or dead skin with a fingernail clipper, pumice stone, emery board, or 120 grit sandpaper.      2.  Place one drop of salicylic acid liquid or a patch on each wart. Try to apply this medication about 2 mm beyond the lesion and try not to get the medicine on the surrounding skin.     3.  Cover the treated area with strong tape to keep the medicine in place. You can use athletic tape, a Band-Aid, or duct tape.  Keep this in place all day.  If you skip a couple days, you will lose the benefit of the previous several days of treatment.     4.  Every few days remove the dead skin.  Reapply the salicylic acid each day, keeping it on place all day.     5.  If you have severe or intolerable skin irritation, skip a day between treatments. Then start treatment again. However, skipping treatments will slow down how quickly warts will go away.    6.  To permanently get rid of plantar warts will require 3-12 weeks of constant treatment and irritation.      7.  To move treatment along faster, but also more painful, you may apply over the counter liquid nitrogen to the wart once weekly and do not apply salicylic acid that day, then resume as above.  If a blister forms 1-2 days after the liquid nitrogen application, pop the blister and apply a bandaid. Then reapply the salicylic acid the next day.     8.  Follow up as instructed.  If you develop complications, you must be seen in clinic to evaluate and treat the complication.            Follow-ups after your visit        Who to contact     If you have  "questions or need follow up information about today's clinic visit or your schedule please contact [facility] directly at [phone].  Normal or non-critical lab and imaging results will be communicated to you by MyChart, letter or phone within 4 business days after the clinic has received the results. If you do not hear from us within 7 days, please contact the clinic through [employer] or phone. If you have a critical or abnormal lab result, we will notify you by phone as soon as possible.  Submit refill requests through [employer] or call your pharmacy and they will forward the refill request to us. Please allow 3 business days for your refill to be completed.          Additional Information About Your Visit        [employer] Information     [employer] lets you send messages to your doctor, view your test results, renew your prescriptions, schedule appointments and more. To sign up, go to [email or web address] . Click on \"Log in\" on the left side of the screen, which will take you to the Welcome page. Then click on \"Sign up Now\" on the right side of the page.     You will be asked to enter the access code listed below, as well as some personal information. Please follow the directions to create your username and password.     Your access code is: [email or web address]  Expires: 4/15/2017  8:58 PM     Your access code will  in 90 days. If you need help or a new code, please call your [facility] clinic or [phone].        Care EveryWhere ID     This is your Care EveryWhere ID. This could be used by other organizations to access your [facility] medical records  [id number]        Your Vitals Were     Temperature Height BMI (Body Mass Index)             98  F (36.7  C) 5' 9\" (1.753 m) 25.1 kg/m2          Blood Pressure from Last 3 Encounters:   17 122/82   17 143/88   01/15/17 137/90    Weight from Last 3 Encounters:   17 170 lb (77.1 kg)   17 170 lb (77.1 kg)   01/15/17 180 lb (81.6 kg) "              Today, you had the following     No orders found for display         Today's Medication Changes          These changes are accurate as of: 3/3/17 12:33 PM.  If you have any questions, ask your nurse or doctor.               Start taking these medicines.        Dose/Directions    HYDROcodone-acetaminophen 5-325 MG per tablet   Commonly known as:  NORCO   Used for:  Plantar wart   Started by:  King Eduardo DPM        Dose:  1 tablet   Take 1 tablet by mouth every 6 hours as needed for moderate to severe pain   Quantity:  20 tablet   Refills:  0            Where to get your medicines      Some of these will need a paper prescription and others can be bought over the counter.  Ask your nurse if you have questions.     Bring a paper prescription for each of these medications     HYDROcodone-acetaminophen 5-325 MG per tablet                Primary Care Provider Office Phone # Fax #    Luis Alden Parra [phone]       [address] [place] [address]        Thank you!     Thank you for choosing [facility]  for your care. Our goal is always to provide you with excellent care. Hearing back from our patients is one way we can continue to improve our services. Please take a few minutes to complete the written survey that you may receive in the mail after your visit with us. Thank you!             Your Updated Medication List - Protect others around you: Learn how to safely use, store and throw away your medicines at www.disposemymeds.org.          This list is accurate as of: 3/3/17 12:33 PM.  Always use your most recent med list.                   Brand Name Dispense Instructions for use    blood glucose monitoring lancets     1 Box    Use to test blood sugar 3 times daily or as directed.       * blood glucose monitoring meter device kit     1 kit    Use to test blood sugars 2 times daily or as directed.       * blood glucose monitoring  "meter device kit    no brand specified    1 kit    Use to test blood sugar 2 times daily or as directed. One Touch ultra meter and all supplies needed.       blood glucose monitoring test strip    no brand specified    1 Box    Use to test blood sugars 3 times daily or as directed       HYDROcodone-acetaminophen 5-325 MG per tablet    NORCO    20 tablet    Take 1 tablet by mouth every 6 hours as needed for moderate to severe pain       insulin glargine 100 UNIT/ML injection    LANTUS VIAL    20 mL    Inject 20 Units Subcutaneous At Bedtime       insulin lispro 100 UNIT/ML injection    HumaLOG KWIKpen    30 mL    60 units per day max in divided doses per sliding scale       order for DME     1 Device    Equipment being ordered:       Accu check ras plus meter and supplies(strips, sol'n, lancets)  check qid       pen needles 1/2\" 29G X 12MM Misc     100 each    1 Device 4 times daily       * Notice:  This list has 2 medication(s) that are the same as other medications prescribed for you. Read the directions carefully, and ask your doctor or other care provider to review them with you.      "

## 2017-03-03 NOTE — PROGRESS NOTES
"HPI:  Joaquim Lange is a 21 year old male who is seen in consultation at the request of Dr. Parra.    Pt presents for evaluation of:     Wart bottom left foot raised, loss of skin lines, pinpoint bleeding, very painful with any palpation limiting his ability to go to work.    Onset:  January  Symptoms brought on by .    Location:  Left foot    Laterality:  bottom of foot    Character:  sharp    Progression of symptoms:  worse    Previous similar pain: no      Pain Level:  7/10 with pressure    Previous treatments:  nothing    Previous foot or ankle injuries throughout your life that took you out of work or play for more than a few days? YES     Works as a construction.    Shoe gear: athletic shoes and work boots    Weight management plan: Patient was referred to their PCP to discuss a diet and exercise plan.    Review of Systems:  Patient denies fever, chills, rash, wound, stiffness, limping, numbness, weakness, heart burn, blood in stool, chest pain with activity, calf pain when walking, shortness of breath with activity, chronic cough, easy bleeding/bruising, swelling of ankles, excessive thirst, fatigue, depression, anxiety.       PAST MEDICAL HISTORY:   Past Medical History   Diagnosis Date     Type I (juvenile type) diabetes mellitus without mention of complication, not stated as uncontrolled 07/03/02     sent down to [facility] for admission and evaluation        PAST SURGICAL HISTORY:   Past Surgical History   Procedure Laterality Date     No history of surgery          MEDICATIONS:   Current Outpatient Prescriptions:      HYDROcodone-acetaminophen (NORCO) 5-325 MG per tablet, Take 1 tablet by mouth every 6 hours as needed for moderate to severe pain, Disp: 20 tablet, Rfl: 0     insulin glargine (LANTUS VIAL) 100 UNIT/ML injection, Inject 20 Units Subcutaneous At Bedtime, Disp: 20 mL, Rfl: 1     Insulin Pen Needle (PEN NEEDLES 1/2\") 29G X 12MM MISC, 1 Device 4 times daily, Disp: 100 each, Rfl: " "3     insulin lispro (HUMALOG KWIKPEN) 100 UNIT/ML injection, 60 units per day max in divided doses per sliding scale, Disp: 30 mL, Rfl: 1     blood glucose monitoring (ACCU-CHEK MULTICLIX) lancets, Use to test blood sugar 3 times daily or as directed., Disp: 1 Box, Rfl: prn     blood glucose monitoring (NO BRAND SPECIFIED) test strip, Use to test blood sugars 3 times daily or as directed, Disp: 1 Box, Rfl: 5     order for DME, Equipment being ordered:       Accu check ras plus meter and supplies(strips, sol'n, lancets)  check qid, Disp: 1 Device, Rfl: 0     blood glucose monitoring (NO BRAND SPECIFIED) meter device kit, Use to test blood sugar 2 times daily or as directed. One Touch ultra meter and all supplies needed., Disp: 1 kit, Rfl: 0     blood glucose monitoring (JIA CONTOUR MONITOR) meter device kit, Use to test blood sugars 2 times daily or as directed., Disp: 1 kit, Rfl: 0     ALLERGIES:    Allergies   Allergen Reactions     No Known Drug Allergies         SOCIAL HISTORY:   Social History     Social History     Marital status: Single     Spouse name: N/A     Number of children: N/A     Years of education: N/A     Occupational History     Not on file.     Social History Main Topics     Smoking status: Current Every Day Smoker     Packs/day: 0.75     Years: 3.00     Types: Cigarettes     Smokeless tobacco: Never Used      Comment: started age 17     Alcohol use No     Drug use: No     Sexual activity: Yes     Partners: Female     Other Topics Concern     Not on file     Social History Narrative        FAMILY HISTORY:   Family History   Problem Relation Age of Onset     DIABETES Father      Type I since childhood     DIABETES Maternal Aunt      Type II        EXAM:Vitals: Temp 98  F (36.7  C)  Ht 5' 9\" (1.753 m)  Wt 170 lb (77.1 kg)  BMI 25.1 kg/m2  BMI= Body mass index is 25.1 kg/(m^2).    General appearance: Patient is alert and fully cooperative with history & exam.  No sign of distress is noted during " the visit.     Psychiatric: Affect is pleasant & appropriate.  Patient appears motivated to improve health.     Respiratory: Breathing is regular & unlabored while sitting.     HEENT: Hearing is intact to spoken word.  Speech is clear.  No gross evidence of visual impairment that would impact ambulation.     Vascular: DP & PT pulses are intact & regular bilaterally.  No significant edema or varicosities noted.  CFT and skin temperature is normal to both lower extremities.     Neurologic: Lower extremity sensation is intact to light touch.  No evidence of weakness or contracture in the lower extremities.  No evidence of neuropathy.    Dermatologic: Skin is intact to both lower extremities with normal texture, turgor and tone.  A nucleated hyperkeratotic mass is noted about the left foot just anterior to the calcaneus. There is pinpoint bleeding and loss of skin lines. The entire mass measures approximately 15 mm.    Musculoskeletal: Patient is ambulatory without assistive device or brace.  No gross ankle deformity noted.  No foot or ankle joint effusion is noted.      Hemoglobin A1C (%)   Date Value   02/17/2017 10.9 (H)   10/01/2015 10.4 (H)   07/03/2002 15.0 (H)     Creatinine (mg/dL)   Date Value   02/17/2017 0.67   10/01/2015 0.73       ASSESSMENT:       ICD-10-CM    1. Plantar wart B07.0 HYDROcodone-acetaminophen (NORCO) 5-325 MG per tablet   2. Uncontrolled type 1 diabetes mellitus without complication (H) E10.9         PLAN:  Reviewed patient's chart in epic.  Discussed treatment options. Treatment options include multiple applications of freezing, cantharidin, salicylic acid with occlusive dressing, prescription antiviral topicals, or surgical excision or ablation.     After discussing these options and potential outcomes and post op care the patient wishes to pursue topical treatments.  I pared the lesion/s to a bleeding base and applied two applications of topical Cantharone. Sterile dressing was applied.   Patient was instructed to exsanguinate any blister that forms and protect with a bandage.  As soon as tolerated the patient was instructed to apply over-the-counter topical liquid wart remover under occlusive dressing 24 hours per day 7 days per week as tolerated. Follow up with me in 2-3 weeks for debridement for comfort, reevaluation and further treatment if needed.  I explained this may take multiple applications to be of benefit. Topical treatments must remain consistent 24/7 until resolved, discontinuing treatment for a short period of time will eliminate efficacy. All questions were answered to their satisfaction. Written instructions were dispensed      If this becomes painful he was instructed to reduce weightbearing and begin utilizing crutches if necessary these can be ordered for him if needed. Most people will not need them.    We also discussed higher risk of infection and complication secondary to where he is at with managing his blood glucose. Considerable time spent today discussing the nature of diabetes and risk factors regarding lower extremities. I recommended to him that this be improved immediately. Recommended follow-up with further diabetes education, further management of diabetes and understanding to reduce risks. Considerable time spent in regards to this. All questions were answered.    In addition to the above history and physical exam, approximately 40 minutes of time was spent with the patient, greater than 50% directly with the patient, counseling, educating, and coordinating care in regards to the above noted multiple diagnoses in addition to performing the documented procedure.        King Eduardo DPM

## 2017-03-06 DIAGNOSIS — B07.0 PLANTAR WART: ICD-10-CM

## 2017-03-06 RX ORDER — HYDROCODONE BITARTRATE AND ACETAMINOPHEN 5; 325 MG/1; MG/1
1 TABLET ORAL EVERY 6 HOURS PRN
Qty: 5 TABLET | Refills: 0 | Status: SHIPPED | OUTPATIENT
Start: 2017-03-06 | End: 2018-01-17

## 2017-03-06 NOTE — TELEPHONE ENCOUNTER
Reason for Call:  Other prescription    Detailed comments: Patient stated that he's dog got into his pills and ate them all.     Phone Number Patient can be reached at: Cell number on file:    Telephone Information:   Mobile [phone]       Best Time: any    Can we leave a detailed message on this number? YES    Call taken on 3/6/2017 at 5:55 PM by Melissa Loomis

## 2017-03-06 NOTE — TELEPHONE ENCOUNTER
Norco 5-325mg      Last Written Prescription Date: 03/03/2017  Last Fill Quantity: 20,  # refills: 0   Last Office Visit with [facility], [facility] or [facility] prescribing provider: 03/03/2017                                         Next 5 appointments (look out 90 days)     Mar 17, 2017  3:20 PM CDT   [facility]    [address] [facility] [id number] [phone]                  Marielena Bar, Pharmacy Technician  [facility]  [phone]

## 2017-03-07 NOTE — TELEPHONE ENCOUNTER
He should be instructed to take his dog to the vet to evalaute to have the stomach pumped.  I have refilled 5 tabs.  His blister should be tolerable at this point.

## 2017-03-10 RX ORDER — INSULIN GLARGINE 100 [IU]/ML
20 INJECTION, SOLUTION SUBCUTANEOUS AT BEDTIME
Qty: 15 ML | Refills: 0 | Status: SHIPPED | OUTPATIENT
Start: 2017-03-10 | End: 2019-01-29

## 2017-03-10 NOTE — TELEPHONE ENCOUNTER
Joaquim has a $1,500 deductible that has not been met yet this year.   Basaglar is alternative for lantus that is $170 cheaper per month.

## 2017-06-08 ENCOUNTER — TELEPHONE (OUTPATIENT)
Dept: INTERNAL MEDICINE | Facility: CLINIC | Age: 22
End: 2017-06-08

## 2017-06-08 NOTE — TELEPHONE ENCOUNTER
Attempted outreach to patient: Left message    Patient is due for:  HGB A1C check    If patient had this completed elsewhere, please obtain approximate date, clinic/hospital where test was done and the result (abnormal/normal).    Please assist in scheduling if not completed.      Panel Management Review      Patient has the following on his problem list:     Diabetes    ASA: Failed    Last A1C  Lab Results   Component Value Date    A1C 10.9 02/17/2017    A1C 10.4 10/01/2015    A1C 15.0 07/03/2002     A1C tested: FAILED    Last LDL:    Lab Results   Component Value Date    CHOL 141 02/17/2017     Lab Results   Component Value Date    HDL 56 02/17/2017     Lab Results   Component Value Date    LDL 58 02/17/2017     Lab Results   Component Value Date    TRIG 134 02/17/2017     Lab Results   Component Value Date    CHOLHDLRATIO 3.3 11/04/2015     Lab Results   Component Value Date    NHDL 85 02/17/2017       Is the patient on a Statin? NO             Is the patient on Aspirin? NO        Last three blood pressure readings:  BP Readings from Last 3 Encounters:   02/17/17 122/82   02/02/17 143/88   01/15/17 137/90       Date of last diabetes office visit: 2/17/17     Tobacco History:     History   Smoking Status     Current Every Day Smoker     Packs/day: 0.75     Years: 3.00     Types: Cigarettes   Smokeless Tobacco     Never Used     Comment: started age 17           Composite cancer screening  Chart review shows that this patient is due/due soon for the following None  Summary:    Patient is due/failing the following:   A1C    Action needed:   Patient needs non-fasting lab only appointment    Type of outreach:    Phone, left message for patient to call back.     Questions for provider review:    None                                                                                                                                    Mera MEZA       Chart routed to Care Team .

## 2017-06-08 NOTE — LETTER
[address] [address]  [phone]        June 26, 2017    Joaquim Lange  [address]  [facility] [address]              Dear Joaquim Lange    This is to remind you that your diabetic recheck is due.    You may call our office at [phone] to schedule an appointment.    Please disregard this notice if you have already had your labs drawn or made an appointment.        Sincerely,        Luis Parra DO

## 2017-07-06 DIAGNOSIS — E10.65 TYPE 1 DIABETES MELLITUS WITH HYPERGLYCEMIA (H): ICD-10-CM

## 2017-07-07 NOTE — TELEPHONE ENCOUNTER
Patient requesting refill of Lantus Solostar.  Has copay card from drug company for zero copay.  His last Rx was for Basaglar, but not picked up because he could not afford it.    LOV 2/17/2017    Thank you,  Muna Bhakta [facility]  [phone]

## 2017-07-21 ENCOUNTER — HEALTH MAINTENANCE LETTER (OUTPATIENT)
Age: 22
End: 2017-07-21

## 2017-08-11 ENCOUNTER — HEALTH MAINTENANCE LETTER (OUTPATIENT)
Age: 22
End: 2017-08-11

## 2017-08-14 ENCOUNTER — TELEPHONE (OUTPATIENT)
Dept: INTERNAL MEDICINE | Facility: CLINIC | Age: 22
End: 2017-08-14

## 2017-09-27 ENCOUNTER — DOCUMENTATION ONLY (OUTPATIENT)
Dept: INTERNAL MEDICINE | Facility: CLINIC | Age: 22
End: 2017-09-27

## 2017-10-09 ENCOUNTER — TELEPHONE (OUTPATIENT)
Dept: EDUCATION SERVICES | Facility: CLINIC | Age: 22
End: 2017-10-09

## 2017-10-09 DIAGNOSIS — E10.65 TYPE 1 DIABETES MELLITUS WITH HYPERGLYCEMIA (H): ICD-10-CM

## 2017-10-09 NOTE — TELEPHONE ENCOUNTER
brunilda      Last Written Prescription Date: 07/07/2017  Last Fill Quantity: 6,  # refills: 0   Last Office Visit with [facility], [facility] or [facility] prescribing provider: 03/17/2017    Thank You,  Ridge Serrano, Pharmacy [facility] [place]

## 2017-10-09 NOTE — TELEPHONE ENCOUNTER
Called patient to offer diabetes education and he states he has no insurance and is out of insulin. Provided [place] [employer]cription [employer] program phone number and my phone number.   Plan : patient will call [place] [employer] program.     Sarai Watson RDN, FANNY, CDE

## 2017-10-11 ENCOUNTER — DOCUMENTATION ONLY (OUTPATIENT)
Dept: INTERNAL MEDICINE | Facility: CLINIC | Age: 22
End: 2017-10-11

## 2017-10-11 NOTE — PROGRESS NOTES
Diabetic Review Team Follow up  Gaps identified on last contact:  Elevated A1c ,   Plan from last contact:  DE reach out to pt  Progress: DE contacted pt and realized he is out of insulin and no insurance.  Pt was given [employer] prescription assistance program number to contact.   New Gaps Identified: No insurance coverage  New/Continuing plan:  DE will outreach again and give pt Financial Counselors  Number [phone]    Joyce Mazariegos RN,BSN  Clinical Care Coordinator      Felicitas Moore BSW  Clinical Care Coordinator    Sarai Watson RDN, LD, CDE  Diabetic     Alex Ace PharmD  [facility] pharmacist    Hanane Martinez [facility]  Behavioral Health Clinician     Adrienne Barreto RN  Specialty Care Patient Supervisor    Verna Ram   [facility] Administrator [address]

## 2017-10-12 NOTE — PROGRESS NOTES
Reached patient today. He has not called the [place] Prescription Assistance program yet. States he has been too busy working. States he has no insurance in place. Has 30 units humalog left and 2 basal insulin pens left. He has already used the one time voucher for a free box of Humalog pens.   Provided phone number for financial counselors here at [facility].   Plan: patient states he will call them.     Sarai Watson RDN, FANNY, CDE

## 2018-01-11 ENCOUNTER — TELEPHONE (OUTPATIENT)
Dept: FAMILY MEDICINE | Facility: CLINIC | Age: 23
End: 2018-01-11

## 2018-01-11 NOTE — TELEPHONE ENCOUNTER
Panel Management Review      Patient has the following on his problem list:     Diabetes    ASA: Failed    Last A1C  Lab Results   Component Value Date    A1C 10.9 02/17/2017    A1C 10.4 10/01/2015    A1C 15.0 07/03/2002     A1C tested: FAILED    Last LDL:    Lab Results   Component Value Date    CHOL 141 02/17/2017     Lab Results   Component Value Date    HDL 56 02/17/2017     Lab Results   Component Value Date    LDL 58 02/17/2017     Lab Results   Component Value Date    TRIG 134 02/17/2017     Lab Results   Component Value Date    CHOLHDLRATIO 3.3 11/04/2015     Lab Results   Component Value Date    NHDL 85 02/17/2017       Is the patient on a Statin? NO             Is the patient on Aspirin? NO        Last three blood pressure readings:  BP Readings from Last 3 Encounters:   02/17/17 122/82   02/02/17 143/88   01/15/17 137/90       Date of last diabetes office visit: 3/3/2017     Tobacco History:     History   Smoking Status     Current Every Day Smoker     Packs/day: 0.75     Years: 3.00     Types: Cigarettes   Smokeless Tobacco     Never Used     Comment: started age 17             Composite cancer screening  Chart review shows that this patient is due/due soon for the following None  Summary:    Patient is due/failing the following:   A1C and FOLLOW UP    Action needed:   Patient needs office visit for diabtestes.    Type of outreach:    Sent letter.    Questions for provider review:    None                                                                                                                                    Mera MEZA       Chart routed to Care Team .

## 2018-01-11 NOTE — LETTER
[address] [address]  [phone]        Joaquim Lange  [address] [place] [address]      January 11, 2018      Dear Joaquim,    I care about your health and have reviewed your health plan, including your medical conditions, medication list, and lab results and am making recommendations based on this review, to better manage your health.    You are in particular need of attention regarding:  -Diabetes    I am recommending that you:  -schedule a FOLLOWUP OFFICE APPOINTMENT with me.  I will recheck your: A1c test.    If you've had the preventative screening completed at another facility or feel you're not due for this screening, please call our clinic at the number listed above or send us a My Chart message so we can update our records. We would like to thank you in advance for taking the time to take care of your health.  If you have any questions, please don t hesitate to contact our clinic.    Sincerely,       Your [facility] Team

## 2018-01-17 ENCOUNTER — HOSPITAL ENCOUNTER (EMERGENCY)
Facility: CLINIC | Age: 23
Discharge: HOME | End: 2018-01-17
Attending: FAMILY MEDICINE | Admitting: FAMILY MEDICINE

## 2018-01-17 ENCOUNTER — TELEPHONE (OUTPATIENT)
Dept: FAMILY MEDICINE | Facility: OTHER | Age: 23
End: 2018-01-17

## 2018-01-17 VITALS
OXYGEN SATURATION: 98 % | SYSTOLIC BLOOD PRESSURE: 139 MMHG | BODY MASS INDEX: 25.1 KG/M2 | RESPIRATION RATE: 20 BRPM | TEMPERATURE: 98 F | DIASTOLIC BLOOD PRESSURE: 97 MMHG | HEART RATE: 92 BPM | WEIGHT: 170 LBS

## 2018-01-17 DIAGNOSIS — T23.292A PARTIAL THICKNESS BURN OF MULTIPLE SITES OF LEFT HAND, INITIAL ENCOUNTER: ICD-10-CM

## 2018-01-17 PROCEDURE — 25000132 ZZH RX MED GY IP 250 OP 250 PS 637: Performed by: FAMILY MEDICINE

## 2018-01-17 PROCEDURE — 99284 EMERGENCY DEPT VISIT MOD MDM: CPT | Mod: Z6 | Performed by: FAMILY MEDICINE

## 2018-01-17 PROCEDURE — 99283 EMERGENCY DEPT VISIT LOW MDM: CPT | Performed by: FAMILY MEDICINE

## 2018-01-17 RX ORDER — OXYCODONE HYDROCHLORIDE 5 MG/1
10 TABLET ORAL ONCE
Status: COMPLETED | OUTPATIENT
Start: 2018-01-17 | End: 2018-01-17

## 2018-01-17 RX ORDER — OXYCODONE HYDROCHLORIDE 5 MG/1
5-10 TABLET ORAL EVERY 4 HOURS PRN
Qty: 20 TABLET | Refills: 0 | Status: SHIPPED | OUTPATIENT
Start: 2018-01-17 | End: 2019-01-18

## 2018-01-17 RX ORDER — ACETAMINOPHEN 500 MG
1000 TABLET ORAL EVERY 6 HOURS PRN
Refills: 0 | COMMUNITY
Start: 2018-01-17

## 2018-01-17 RX ORDER — IBUPROFEN 200 MG
400 TABLET ORAL ONCE
Status: COMPLETED | OUTPATIENT
Start: 2018-01-17 | End: 2018-01-17

## 2018-01-17 RX ORDER — IBUPROFEN 200 MG
400 TABLET ORAL EVERY 6 HOURS PRN
COMMUNITY
Start: 2018-01-17 | End: 2018-01-24

## 2018-01-17 RX ADMIN — OXYCODONE HYDROCHLORIDE 10 MG: 5 TABLET ORAL at 23:46

## 2018-01-17 RX ADMIN — IBUPROFEN 400 MG: 200 TABLET, FILM COATED ORAL at 23:46

## 2018-01-17 NOTE — ED AVS SNAPSHOT
[facility] Emergency Department    [address] [place] [address]    Phone:  [phone]    Fax:  [phone]                                       Joaquim Lange   MRN: [id number]    Department:  [facility] Emergency Department   Date of Visit:  1/17/2018           After Visit Summary Signature Page     I have received my discharge instructions, and my questions have been answered. I have discussed any challenges I see with this plan with the nurse or doctor.    ..........................................................................................................................................  Patient/Patient Representative Signature      ..........................................................................................................................................  Patient Representative Print Name and Relationship to Patient    ..................................................               ................................................  Date                                            Time    ..........................................................................................................................................  Reviewed by Signature/Title    ...................................................              ..............................................  Date                                                            Time
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
## 2018-01-18 NOTE — TELEPHONE ENCOUNTER
Left message at # to call back. Please get a hold of someone on the team if he returns this call so we can help him schedule.   Mera MEZA
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
## 2018-01-22 NOTE — TELEPHONE ENCOUNTER
Messages left for patient to call back, discharge instructions also given to patient that addressed follow up. Patient has not contacted us for this appointment.    Ruthie Dang XRO/  [facility]

## 2018-04-11 ENCOUNTER — DOCUMENTATION ONLY (OUTPATIENT)
Dept: FAMILY MEDICINE | Facility: CLINIC | Age: 23
End: 2018-04-11

## 2018-04-11 NOTE — PROGRESS NOTES
Diabetic Review Team Follow up  Gaps identified on last contact:  No insurance coverage, did not contact [facility] prescription assistance.   Plan from last contact: Gave number for financial counselors.   Progress: No current insurance listed on file  New Gaps Identified: same gap-no insurance  New/Continuing plan:    [facility] CC will outreach to pt    Joyce Mazariegos RN,BSN  Clinical Care Coordinator      Bronwyn Wiggins [facility]  Care Coordination     Sarai Watson RDN, LD, CDE  Diabetic     Hanane Martinez [facility]  Behavioral Health Clinician

## 2018-04-12 ENCOUNTER — PATIENT OUTREACH (OUTPATIENT)
Dept: CARE COORDINATION | Facility: CLINIC | Age: 23
End: 2018-04-12

## 2018-04-12 NOTE — PROGRESS NOTES
Clinic Care Coordination Contact  Care Team Conversations    Pt is on Diabetic Review Team list. Reviewed last contact with pt and concern of not having insurance for insulin. CC doing outreach with pt to assist pt with resources in obtaining insurance. Phone call made to pt to introduce self and role. Pt sounded very tired or as if not feeling well, pt stated it was not a good time and asked if he could call CC back. Pt has [facility] phone number.     Plan: Pt states he'll call [facility] back at a later time. [facility] to outreach again to pt in 5-7 days if do not hear from pt.     ANSLEY Esquivel Clinic Care Coordinator  [facility]  4/12/2018 1:39 PM  [phone]

## 2018-04-24 ENCOUNTER — PATIENT OUTREACH (OUTPATIENT)
Dept: CARE COORDINATION | Facility: CLINIC | Age: 23
End: 2018-04-24

## 2018-04-24 NOTE — PROGRESS NOTES
Clinic Care Coordination Contact  [facility]/Voicemail    Referral Source: Care Team  Clinical Data: Care Coordinator Outreach- SW CC had spoke with pt briefly last week and pt stated it wasn't a good time to talk and would call SW CC back. Have not received a phone call from pt so attempting to reach him to follow up on him not having insurance for diabetic medications and to give resources.   Outreach attempted x 1.  Left message on voicemail with call back information and requested return call.  Plan: Care Coordinator will mail out care coordination introduction letter with care coordinator contact information and explanation of care coordination services. Care Coordinator will try to reach patient again in 5-7 business days.    ANSLEY Esquivel [facility] Care Coordinator  [facility]  4/24/2018 1:09 PM  [phone]

## 2018-04-24 NOTE — LETTER
Health Care Home - Access Care Plan    About Me  Patient Name:  Joaquim Jameson    YOB: 1995  Age:                             23 year old   Lindsay MRN:            [id number] Telephone Information:     Home Phone [phone]   Mobile [phone]       Address:    [address] [facility] [address] Email address:  No e-mail address on record      Emergency Contact(s)  Name Relationship Lgl Grd Work Phone Home Phone Mobile Phone   1. SYLVIE JAMESON Mother No   [phone]   2. SIA LEÓN  Father   [phone]              Health Maintenance:      My Access Plan  Medical Emergency 911   Questions or concerns during clinic hours Primary Clinic Line, I will call the clinic directly: [facility] - [phone]   24 Hour Appointment Line [phone] or  [phone]) (toll free)   24 Hour Nurse Line [phone] (toll free)   Questions or concerns outside clinic hours 24 Hour Appointment Line, I will call the after-hours on-call line:   [facility] [phone] or [phone] ([phone]) (toll-free)   Preferred Urgent Care     Preferred Hospital     Preferred [id number] - [place], [place] - [address]     Behavioral Health Crisis Line The National Suicide Prevention Lifeline at 1-847.756.9021 or 911     My Care Team Members  Patient Care Team       Relationship Specialty Notifications Start End    Luis Parra DO PCP - General Internal Medicine  9/25/15     Phone: [phone] Fax: [phone]         [address] [place] [address]    Bronwyn Wiggins LSW Clinic Care Coordinator Primary Care - CC Admissions 4/12/18     Phone: [phone]                My Medical and Care Information  Problem List   Patient Active Problem List   Diagnosis     Type 1 diabetes mellitus with hyperglycemia (H)     CARDIOVASCULAR SCREENING; LDL GOAL LESS THAN 100      Current Medications and Allergies:  See  printed Medication Report

## 2018-04-24 NOTE — LETTER
[place] CARE COORDINATION  [facility]  [address] [place] [address]    April 24, 2018    Joaquim Lange  [address] [facility] [address]      Dear Joaquim,    I am a clinic care coordinator who works with Luis Parra DO at [facility]. I have been trying to reach you recently to introduce Clinic Care Coordination and to see if I can assist in helping you obtain insurance, prescription drug coverage for your medications. I wanted to introduce myself and provide you with my contact information so that you can call me with questions or concerns about your health care. Below is a description of clinic care coordination and how I can further assist you.     The clinic care coordinator is a registered nurse and/or  who understand the health care system. The goal of clinic care coordination is to help you manage your health and improve access to the [facility] system in the most efficient manner. The registered nurse can assist you in meeting your health care goals by providing education, coordinating services, and strengthening the communication among your providers. The  can assist you with financial, behavioral, psychosocial, chemical dependency, counseling, and/or psychiatric resources.    Please feel free to contact me at [phone], with any questions or concerns. We at [facility] are focused on providing you with the highest-quality healthcare experience possible and that all starts with you.     Sincerely,     ANSLEY Esquivel Clinic Care Coordinator                                                        [facility]                                                      [phone]    Enclosed: I have enclosed a copy of a 24 Hour Access Plan. This has helpful phone numbers for you to call when needed. Please keep this in an easy to access place to use as needed.

## 2018-05-08 ENCOUNTER — PATIENT OUTREACH (OUTPATIENT)
Dept: CARE COORDINATION | Facility: CLINIC | Age: 23
End: 2018-05-08

## 2018-05-08 NOTE — PROGRESS NOTES
Clinic Care Coordination Contact  [facility]/Voicemail    Referral Source: Care Team  Clinical Data: Care Coordinator Outreach-  CC has been attempting to reach pt to give resources for insurance for diabetic medications. Outreaches have been unsuccessful.   Outreach attempted x 2.  Left message on voicemail with call back information and requested return call.  Plan: Care Coordinator mailed out care coordination introduction letter on 4/24/18. Care Coordinator will do no further outreaches at this time.    ANSLEY Esquivel Clinic Care Coordinator  [facility]  5/8/2018 12:03 PM  [phone]

## 2018-05-14 ENCOUNTER — HOSPITAL ENCOUNTER (EMERGENCY)
Facility: CLINIC | Age: 23
Discharge: HOME | End: 2018-05-14
Attending: PHYSICIAN ASSISTANT | Admitting: PHYSICIAN ASSISTANT

## 2018-05-14 ENCOUNTER — APPOINTMENT (OUTPATIENT)
Dept: GENERAL RADIOLOGY | Facility: CLINIC | Age: 23
End: 2018-05-14
Attending: PHYSICIAN ASSISTANT

## 2018-05-14 VITALS
BODY MASS INDEX: 25.77 KG/M2 | RESPIRATION RATE: 16 BRPM | SYSTOLIC BLOOD PRESSURE: 138 MMHG | DIASTOLIC BLOOD PRESSURE: 74 MMHG | HEIGHT: 70 IN | WEIGHT: 180 LBS | OXYGEN SATURATION: 97 % | TEMPERATURE: 98.7 F

## 2018-05-14 DIAGNOSIS — S89.91XA KNEE INJURY, RIGHT, INITIAL ENCOUNTER: ICD-10-CM

## 2018-05-14 PROCEDURE — 73560 X-RAY EXAM OF KNEE 1 OR 2: CPT | Mod: TC,RT

## 2018-05-14 PROCEDURE — 99283 EMERGENCY DEPT VISIT LOW MDM: CPT | Mod: Z6 | Performed by: PHYSICIAN ASSISTANT

## 2018-05-14 PROCEDURE — 99283 EMERGENCY DEPT VISIT LOW MDM: CPT

## 2018-05-14 RX ORDER — HYDROCODONE BITARTRATE AND ACETAMINOPHEN 5; 325 MG/1; MG/1
1 TABLET ORAL
Qty: 8 TABLET | Refills: 0 | Status: SHIPPED | OUTPATIENT
Start: 2018-05-14 | End: 2019-01-18

## 2018-05-14 ASSESSMENT — ENCOUNTER SYMPTOMS
NUMBNESS: 0
FEVER: 0
WOUND: 0

## 2018-05-14 NOTE — ED PROVIDER NOTES
"  History     Chief Complaint   Patient presents with     Knee Pain     HPI  Joaquim Lange is a 23 year old male who presents to the emergency department complaining of right knee pain.  The patient reports he was using a wheelbarrow to move dirt in his yard.  As he tried to dump the wheelbarrow out it got caught on a 2 x 4 in the yard.  It started to wobble so he braced it with his right knee.  The weight was too heavy causing the knee to buckle inward and he felt like something \"tore apart.\"  He complains of pain to the lateral aspect of the knee.  He took 2 ibuprofen after the event but that did not help, so he found oxycodone from a previous injury from a few months ago at home and took 2 pills of this as well.  He reports walking on it only hurts if he tries to turn or twist, but bearing weight standing still or walking straight does not hurt.  He denies any other injuries today.  No numbness in the leg.    Problem List:    Patient Active Problem List    Diagnosis Date Noted     Type 1 diabetes mellitus with hyperglycemia (H) 10/01/2015     Priority: Medium     CARDIOVASCULAR SCREENING; LDL GOAL LESS THAN 100 10/01/2015     Priority: Medium        Past Medical History:    Past Medical History:   Diagnosis Date     Type I (juvenile type) diabetes mellitus without mention of complication, not stated as uncontrolled 07/03/02       Past Surgical History:    Past Surgical History:   Procedure Laterality Date     NO HISTORY OF SURGERY         Family History:    Family History   Problem Relation Age of Onset     DIABETES Father      Type I since childhood     DIABETES Maternal Aunt      Type II       Social History:  Marital Status:  Single [1]  Social History   Substance Use Topics     Smoking status: Current Every Day Smoker     Packs/day: 0.75     Years: 3.00     Types: Cigarettes     Smokeless tobacco: Never Used      Comment: started age 17     Alcohol use No        Medications:      HYDROcodone-acetaminophen " "(NORCO) 5-325 MG per tablet   IBUPROFEN PO   oxyCODONE IR (ROXICODONE) 5 MG tablet   acetaminophen (TYLENOL) 500 MG tablet   blood glucose monitoring (ACCU-CHEK MULTICLIX) lancets   blood glucose monitoring (JIA CONTOUR MONITOR) meter device kit   blood glucose monitoring (NO BRAND SPECIFIED) meter device kit   blood glucose monitoring (NO BRAND SPECIFIED) test strip   insulin glargine (BASAGLAR KWIKPEN) 100 UNIT/ML injection   insulin glargine (LANTUS SOLOSTAR) 100 UNIT/ML injection   insulin lispro (HUMALOG KWIKPEN) 100 UNIT/ML injection   Insulin Pen Needle (PEN NEEDLES 1/2\") 29G X 12MM MISC   order for DME         Review of Systems   Constitutional: Negative for fever.   Musculoskeletal:        Right knee pain   Skin: Negative for wound.   Neurological: Negative for numbness.   All other systems reviewed and are negative.      Physical Exam   BP: 138/74  Heart Rate: 80  Temp: 98.7  F (37.1  C)  Resp: 16  Height: 177.8 cm (5' 10\")  Weight: 81.6 kg (180 lb)  SpO2: 97 %      Physical Exam   Constitutional: He is oriented to person, place, and time. He appears well-developed and well-nourished. No distress.   HENT:   Head: Normocephalic and atraumatic.   Eyes: Conjunctivae are normal.   Cardiovascular: Intact distal pulses.    Pulmonary/Chest: Effort normal. No respiratory distress.   Musculoskeletal:        Right knee: He exhibits decreased range of motion and swelling. He exhibits no deformity, normal alignment and normal patellar mobility. Tenderness found. Lateral joint line tenderness noted. No medial joint line and no patellar tendon tenderness noted.        Left knee: Normal.   Left knee: Normal anterior and posterior drawer test.    Neurological: He is alert and oriented to person, place, and time.   Skin: Skin is warm and dry. He is not diaphoretic.   Psychiatric: He has a normal mood and affect.   Nursing note and vitals reviewed.      ED Course     ED Course     Procedures    Results for orders placed " or performed during the hospital encounter of 05/14/18 (from the past 24 hour(s))   XR Knee Right 1/2 Views    Narrative    RIGHT KNEE ONE-TWO VIEWS  5/14/2018 4:56 PM     HISTORY: Trauma.    COMPARISON: None.    FINDINGS: There is no significant degenerative change. No  suprapatellar effusion. There is no acute fracture. No dislocation.   There are no worrisome bony lesions.      Impression    IMPRESSION:  No acute osseous abnormality demonstrated.       Medications - No data to display    Assessments & Plan (with Medical Decision Making)  Joaquim Lange is a 23 year old male who presented to the ED complaining of right knee pain after it buckled while carrying a wheelbarrow.  Denies any other injuries.  Patient exhibited tenderness to the lateral joint line with some swelling noted as well.  He had Joe taken ibuprofen and oxycodone prior to arrival so no pain medication given here.  Ice was applied.  X-rays obtained and showed no bony abnormalities.  I think his mechanism and symptoms are concerning for ligamentous injury.  Patient was placed in a knee immobilizer and advised to avoid bearing weight at all times on the knee.  Given a set of crutches to ambulate.  Encouraged to elevate and ice the knee when not ambulating.  He can use Tylenol or ibuprofen for pain control during the day, and was given a prescription of Norco for pain relief at bedtime as he reported he was out of the oxycodone he had used earlier.  He mentioned that he was told not take Tylenol or ibuprofen because of his diabetes, but could not explain to me why. His kidney function is normal based on previous lab studies and he has been taking it anyway despite being told not to.  I explained to him that ibuprofen can hurt his kidneys in the long term so he should use it sparingly, but short term use should be okay.  I do not see a reason not to use Tylenol.  I did go over proper dosing for that both these medications, and explained there is  Tylenol in the Norco as well.  He was given the contact information of orthopedics to follow-up with.  He should return to the ED for any worsening symptoms.  Patient in agreement with plan and discharged home.     I have reviewed the nursing notes.    I have reviewed the findings, diagnosis, plan and need for follow up with the patient.    Discharge Medication List as of 5/14/2018  5:20 PM      START taking these medications    Details   HYDROcodone-acetaminophen (NORCO) 5-325 MG per tablet Take 1 tablet by mouth nightly as needed for pain, Disp-8 tablet, R-0, Local Print             Final diagnoses:   Knee injury, right, initial encounter     Note: Chart documentation done in part with Dragon Voice Recognition software. Although reviewed after completion, some word and grammatical errors may remain.     5/14/2018   [facility] EMERGENCY DEPARTMENT     Vivian Perkins PA-C  05/14/18 1408

## 2018-05-14 NOTE — ED AVS SNAPSHOT
[facility] Emergency Department    [address]     [place] [address]    Phone:  [phone]    Fax:  [phone]                                       Joaquim Lange   MRN: [id number]    Department:  [facility] Emergency Department   Date of Visit:  5/14/2018           Patient Information     Date Of Birth          1995        Your diagnoses for this visit were:     Knee injury, right, initial encounter        You were seen by Ryan Montero MD and Vivian Perkins PA-C.      Follow-up Information     Call [facility].    Specialty:  Orthopedics    Why:  For ER follow up    Contact information:    [address] [address] [phone]    Additional information:    From [address]: Exit at [address] on south side [address].  Turn right   on [address].  Turn left at stoplight on [address].  [facility] will be in view two blocks ahead.        Follow up with [facility] Emergency Department.    Specialty:  EMERGENCY MEDICINE    Why:  If symptoms worsen    Contact information:    [address]   [facility] [address] [phone]    Additional information:    From [address]: Exit at [address] on [address]. Turn right on [address]. Turn left at stoplight on [address]. [facility] will be in view two blocks ahead        Discharge Instructions       Please use ibuprofen and Tylenol to control your pain-appropriate doses should be okay in regard to your diabetes.  Reserve the use of Norco for severe breakthrough pain.  Wear the knee immobilizer at all times, even during sleep.  Use the crutches to avoid bearing weight on the leg.  Follow-up with orthopedics as discussed.  Return to the emergency department for worsening symptoms.    Thank you for choosing [facility]'s Emergency Department. It was a pleasure taking care of you today. If you have any  questions, please call [phone].    Vivian Perkins PA-C      Discharge References/Attachments     KNEE SPRAIN (ENGLISH)      24 Hour Appointment Hotline       To make an appointment at any [facility] clinic, call [phone] ([phone]). If you don't have a family doctor or clinic, we will help you find one. [facility] clinics are conveniently located to serve the needs of you and your family.          ED Discharge Orders     Crutches       Use gait belt during crutch training.            Knee immobilizer                    Review of your medicines      START taking        Dose / Directions Last dose taken    HYDROcodone-acetaminophen 5-325 MG per tablet   Commonly known as:  NORCO   Dose:  1 tablet   Quantity:  8 tablet        Take 1 tablet by mouth nightly as needed for pain   Refills:  0          Our records show that you are taking the medicines listed below. If these are incorrect, please call your family doctor or clinic.        Dose / Directions Last dose taken    acetaminophen 500 MG tablet   Commonly known as:  TYLENOL   Dose:  1000 mg        Take 2 tablets (1,000 mg) by mouth every 6 hours as needed for pain or fever   Refills:  0        * BASAGLAR 100 UNIT/ML injection   Dose:  20 Units   Quantity:  15 mL        Inject 20 Units Subcutaneous At Bedtime   Refills:  0        * insulin glargine 100 UNIT/ML injection   Commonly known as:  LANTUS SOLOSTAR   Dose:  20 Units   Quantity:  6 mL        Inject 20 Units Subcutaneous At Bedtime   Refills:  0        blood glucose monitoring lancets   Quantity:  1 Box        Use to test blood sugar 3 times daily or as directed.   Refills:  prn        * blood glucose monitoring meter device kit   Quantity:  1 kit        Use to test blood sugars 2 times daily or as directed.   Refills:  0        * blood glucose monitoring meter device kit   Commonly known as:  no brand specified   Quantity:  1 kit        Use to test blood sugar 2 times daily or as directed. One  "Touch ultra meter and all supplies needed.   Refills:  0        blood glucose monitoring test strip   Commonly known as:  no brand specified   Quantity:  1 Box        Use to test blood sugars 3 times daily or as directed   Refills:  5        IBUPROFEN PO        Refills:  0        insulin lispro 100 UNIT/ML injection   Commonly known as:  HumaLOG KWIKpen   Quantity:  30 mL        60 units per day max in divided doses per sliding scale   Refills:  1        order for DME   Quantity:  1 Device        Equipment being ordered:       Accu check ras plus meter and supplies(strips, sol'n, lancets)  check qid   Refills:  0        oxyCODONE IR 5 MG tablet   Commonly known as:  ROXICODONE   Dose:  5-10 mg   Quantity:  20 tablet        Take 1-2 tablets (5-10 mg) by mouth every 4 hours as needed for moderate to severe pain   Refills:  0        pen needles 1/2\" 29G X 12MM Misc   Dose:  1 Device   Quantity:  100 each        1 Device 4 times daily   Refills:  3        * Notice:  This list has 4 medication(s) that are the same as other medications prescribed for you. Read the directions carefully, and ask your doctor or other care provider to review them with you.            Information about OPIOIDS     PRESCRIPTION OPIOIDS: WHAT YOU NEED TO KNOW   You have a prescription for an opioid (narcotic) pain medicine. Opioids can cause addiction. If you have a history of chemical dependency of any type, you are at a higher risk of becoming addicted to opioids. Only take this medicine after all other options have been tried. Take it for as short a time and as few doses as possible.     Do not:    Drive. If you drive while taking these medicines, you could be arrested for driving under the influence (DUI).    Operate heavy machinery    Do any other dangerous activities while taking these medicines.     Drink any alcohol while taking these medicines.      Take with any other medicines that contain acetaminophen. Read all labels carefully. " Look for the word  acetaminophen  or  Tylenol.  Ask your pharmacist if you have questions or are unsure.    Store your pills in a secure place, locked if possible. We will not replace any lost or stolen medicine. If you don t finish your medicine, please throw away (dispose) as directed by your pharmacist. The [place] Pollution Control Agency has more information about safe disposal: https://www.pca.[facility].[place].us/living-green/managing-unwanted-medications    All opioids tend to cause constipation. Drink plenty of water and eat foods that have a lot of fiber, such as fruits, vegetables, prune juice, apple juice and high-fiber cereal. Take a laxative (Miralax, milk of magnesia, Colace, Senna) if you don t move your bowels at least every other day.         Prescriptions were sent or printed at these locations (1 Prescription)                   [facility] [place], [place] - [address]    [address] [facility] [address]    Telephone:  [phone]   Fax:  [phone]   Hours:                  Printed at Department/Unit printer (1 of 1)         HYDROcodone-acetaminophen (NORCO) 5-325 MG per tablet                Procedures and tests performed during your visit     XR Knee Right 1/2 Views      Orders Needing Specimen Collection     None      Pending Results     Date and Time Order Name Status Description    5/14/2018 1641 XR Knee Right 1/2 Views Preliminary             Pending Culture Results     No orders found from 5/12/2018 to 5/15/2018.            Pending Results Instructions     If you had any lab results that were not finalized at the time of your Discharge, you can call the ED Lab Result RN at [phone]. You will be contacted by this team for any positive Lab results or changes in treatment. The nurses are available 7 days a week from 10A to 6:30P.  You can leave a message 24 hours per day and they will return your call.        Thank you for choosing [facility]       Thank you for  "choosing [facility] for your care. Our goal is always to provide you with excellent care. Hearing back from our patients is one way we can continue to improve our services. Please take a few minutes to complete the written survey that you may receive in the mail after you visit with us. Thank you!        [employer] Information     [employer] lets you send messages to your doctor, view your test results, renew your prescriptions, schedule appointments and more. To sign up, go to [email or web address] . Click on \"Log in\" on the left side of the screen, which will take you to the Welcome page. Then click on \"Sign up Now\" on the right side of the page.     You will be asked to enter the access code listed below, as well as some personal information. Please follow the directions to create your username and password.     Your access code is: [email or web address]  Expires: 2018  5:11 PM     Your access code will  in 90 days. If you need help or a new code, please call your [facility] clinic or [phone].        Care EveryWhere ID     This is your Care EveryWhere ID. This could be used by other organizations to access your [facility] medical records  [id number]        Equal Access to Services     SANDRO KOHLER : Jg Cordoba, kenzie osorio, qabrianna kamehrdad boggs, yakov hernandez. So [facility] [phone].    ATENCIÓN: Si habla español, tiene a eaton disposición servicios gratuitos de asistencia lingüística. Llame al [phone].    We comply with applicable federal civil rights laws and [place] laws. We do not discriminate on the basis of race, color, national origin, age, disability, sex, sexual orientation, or gender identity.            After Visit Summary       This is your record. Keep this with you and show to your community pharmacist(s) and doctor(s) at your next visit.                  "
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
## 2018-05-14 NOTE — DISCHARGE INSTRUCTIONS
Please use ibuprofen and Tylenol to control your pain-appropriate doses should be okay in regard to your diabetes.  Reserve the use of Norco for severe breakthrough pain.  Wear the knee immobilizer at all times, even during sleep.  Use the crutches to avoid bearing weight on the leg.  Follow-up with orthopedics as discussed.  Return to the emergency department for worsening symptoms.    Thank you for choosing [facility]'s Emergency Department. It was a pleasure taking care of you today. If you have any questions, please call [phone].    Vivian Perkins PA-C

## 2018-05-14 NOTE — ED AVS SNAPSHOT
[facility] Emergency Department    [address] [place] [address]    Phone:  [phone]    Fax:  [phone]                                       Joaquim Lange   MRN: [id number]    Department:  [facility] Emergency Department   Date of Visit:  5/14/2018           After Visit Summary Signature Page     I have received my discharge instructions, and my questions have been answered. I have discussed any challenges I see with this plan with the nurse or doctor.    ..........................................................................................................................................  Patient/Patient Representative Signature      ..........................................................................................................................................  Patient Representative Print Name and Relationship to Patient    ..................................................               ................................................  Date                                            Time    ..........................................................................................................................................  Reviewed by Signature/Title    ...................................................              ..............................................  Date                                                            Time

## 2018-07-19 ENCOUNTER — TELEPHONE (OUTPATIENT)
Dept: FAMILY MEDICINE | Facility: OTHER | Age: 23
End: 2018-07-19

## 2018-07-19 NOTE — LETTER
[facility]  [address] [facility] [address]  [phone]        Joaquim Lange  [address] [place] [address]      July 19, 2018      Dear Joaquim,    I care about your health and have reviewed your health plan, including your medical conditions, medication list, and lab results and am making recommendations based on this review, to better manage your health.    You are in particular need of attention regarding:  -Diabetes    I am recommending that you:  -schedule a FOLLOWUP OFFICE APPOINTMENT with me.  I will recheck your: A1c, Lipids (fasting cholesterol - nothing to eat except water and/or meds for 8+ hours), BMP (basic metabolic panel), Microablumin and TSH (thyroid test) tests.    If you've had the preventative screening completed at another facility or feel you're not due for this screening, please call our clinic at the number listed above or send us a My Chart message so we can update our records. We would like to thank you in advance for taking the time to take care of your health.  If you have any questions, please don t hesitate to contact our clinic.    Sincerely,       Your [facility] Team

## 2018-07-19 NOTE — TELEPHONE ENCOUNTER
Panel Management Review      Patient has the following on his problem list:     Diabetes    ASA: Failed    Last A1C  Lab Results   Component Value Date    A1C 10.9 02/17/2017    A1C 10.4 10/01/2015    A1C 15.0 07/03/2002     A1C tested: FAILED    Last LDL:    Lab Results   Component Value Date    CHOL 141 02/17/2017     Lab Results   Component Value Date    HDL 56 02/17/2017     Lab Results   Component Value Date    LDL 58 02/17/2017     Lab Results   Component Value Date    TRIG 134 02/17/2017     Lab Results   Component Value Date    CHOLHDLRATIO 3.3 11/04/2015     Lab Results   Component Value Date    NHDL 85 02/17/2017       Is the patient on a Statin? NO             Is the patient on Aspirin? NO        Last three blood pressure readings:  BP Readings from Last 3 Encounters:   05/14/18 138/74   01/17/18 (!) 139/97   02/17/17 122/82       Date of last diabetes office visit: 2/17/17     Tobacco History:     History   Smoking Status     Current Every Day Smoker     Packs/day: 0.75     Years: 3.00     Types: Cigarettes   Smokeless Tobacco     Never Used     Comment: started age 17           Composite cancer screening  Chart review shows that this patient is due/due soon for the following None  Summary:    Patient is due/failing the following:   A1C and LDL    Action needed:   Patient needs office visit for diabetes.    Type of outreach:    Sent letter.    Questions for provider review:    None                                                                                                                                    Mera MEZA       Chart routed to Care Team .

## 2018-10-17 ENCOUNTER — NURSE TRIAGE (OUTPATIENT)
Dept: NURSING | Facility: CLINIC | Age: 23
End: 2018-10-17

## 2018-10-17 NOTE — TELEPHONE ENCOUNTER
"Reason for Call/Nurse Assessment:  22 y/o male calls about left eye, sick with cold/congestion, \"my eye 'pussing' up really really bad\" - later on describes it as 1 drop every 3 seconds, more like a small amount? denies pain, initially c/o blurred vision but states vision is clear until eye starts weeping again. He is wiping away drainage every few seconds, eyelid not swollen, no redness to eye, no fever ([place] he does not have thermometer/not checked). No known injury.     RN Action/Disposiiton:  Reviewed protocols and reassured him he could handle this at home, continue to wipe away pus, should go away in 2-3 days, call back if symptoms change or worsen, pus increases, eyelid becomes swollen, fever, Patient verbalized understanding of and agreement with plan and had no further questions.     Yoselin Sanchez RN - [facility] Nurse Advisor  10/17/2018    2:25AM    Additional Information    Negative: Eye exposure to chemical or fumes    Negative: Redness of white of eye (sclera), but no pus or only a small amount of brief pus    Negative: SEVERE eye pain (e.g., excruciating)    Negative: [1] Eyelids are very swollen (shut or almost) AND [2] fever    Negative: [1] Eyelid (outer) is very red (or tender to touch) AND [2] fever    Negative: Patient sounds very sick or weak to the triager    Negative: MODERATE eye pain (e.g., interferes with normal activities)    Negative: Fever > 104 F (40 C)    Negative: Cloudy spot or sore seen on the cornea (clear part of the eye)    [1] Very small amount of discharge AND [2] only in corner of eye  (all triage questions negative)    Negative: Blurred vision    Negative: Eye is very swollen (shut or almost)    Negative: [1] MILD eye pain or discomfort AND [2] wears contacts    Negative: Eyelid is red and painful (or tender to touch)    Negative: Discharge from penis    Negative: New or abnormal vaginal discharge    Negative: Fever present > 3 days (72 hours)    Negative: [1] Lots of " yellow or green nasal discharge AND [2] present now AND [3] fever    Negative: Weak immune system (e.g., HIV positive, cancer chemo, splenectomy, organ transplant, chronic steroids)    Negative: [1] Eye with yellow/green discharge or eyelashes stick together AND [2] NO PCP standing order to call in antibiotic eye drops    Negative: [1] Using antibiotic eyedrops AND [2] eyes have become very itchy (especially after eyedrops are put in)    Negative: [1] Taking oral antibiotic > 48 hours (2 days) AND [2] pus in eye persists    Negative: [1] Using antibiotic eyedrops > 72 hours (3 days) AND [2] pus in eye persists    Negative: Bleeding on white of the eye    Protocols used: EYE - PUS OR DISCHARGE-ADULT-AH

## 2019-01-18 ENCOUNTER — HOSPITAL ENCOUNTER (EMERGENCY)
Facility: CLINIC | Age: 24
Discharge: HOME | End: 2019-01-18
Attending: EMERGENCY MEDICINE | Admitting: EMERGENCY MEDICINE

## 2019-01-18 VITALS
OXYGEN SATURATION: 100 % | RESPIRATION RATE: 20 BRPM | DIASTOLIC BLOOD PRESSURE: 100 MMHG | SYSTOLIC BLOOD PRESSURE: 164 MMHG | TEMPERATURE: 97.1 F

## 2019-01-18 DIAGNOSIS — F11.93 ACUTE NARCOTIC WITHDRAWAL (H): ICD-10-CM

## 2019-01-18 LAB
AMPHETAMINES UR QL: ABNORMAL NG/ML
ANION GAP SERPL CALCULATED.3IONS-SCNC: 8 MMOL/L (ref 3–14)
BARBITURATES UR QL SCN: NOT DETECTED NG/ML
BASOPHILS # BLD AUTO: 0 10E9/L (ref 0–0.2)
BASOPHILS NFR BLD AUTO: 0.5 %
BENZODIAZ UR QL SCN: NOT DETECTED NG/ML
BUN SERPL-MCNC: 9 MG/DL (ref 7–30)
BUPRENORPHINE UR QL: NOT DETECTED NG/ML
CALCIUM SERPL-MCNC: 8.5 MG/DL (ref 8.5–10.1)
CANNABINOIDS UR QL: NOT DETECTED NG/ML
CHLORIDE SERPL-SCNC: 99 MMOL/L (ref 94–109)
CO2 SERPL-SCNC: 27 MMOL/L (ref 20–32)
COCAINE UR QL SCN: NOT DETECTED NG/ML
CREAT SERPL-MCNC: 0.71 MG/DL (ref 0.66–1.25)
D-METHAMPHET UR QL: NOT DETECTED NG/ML
DIFFERENTIAL METHOD BLD: NORMAL
EOSINOPHIL NFR BLD AUTO: 1.4 %
ERYTHROCYTE [DISTWIDTH] IN BLOOD BY AUTOMATED COUNT: 12.1 % (ref 10–15)
ETHANOL SERPL-MCNC: <0.01 G/DL
GFR SERPL CREATININE-BSD FRML MDRD: >90 ML/MIN/{1.73_M2}
GLUCOSE SERPL-MCNC: 421 MG/DL (ref 70–99)
HCT VFR BLD AUTO: 43.4 % (ref 40–53)
HGB BLD-MCNC: 14.8 G/DL (ref 13.3–17.7)
IMM GRANULOCYTES # BLD: 0 10E9/L (ref 0–0.4)
IMM GRANULOCYTES NFR BLD: 0.4 %
LYMPHOCYTES # BLD AUTO: 1.7 10E9/L (ref 0.8–5.3)
LYMPHOCYTES NFR BLD AUTO: 20.6 %
MCH RBC QN AUTO: 28.7 PG (ref 26.5–33)
MCHC RBC AUTO-ENTMCNC: 34.1 G/DL (ref 31.5–36.5)
MCV RBC AUTO: 84 FL (ref 78–100)
METHADONE UR QL SCN: NOT DETECTED NG/ML
MONOCYTES # BLD AUTO: 0.7 10E9/L (ref 0–1.3)
MONOCYTES NFR BLD AUTO: 8.4 %
NEUTROPHILS # BLD AUTO: 5.8 10E9/L (ref 1.6–8.3)
NEUTROPHILS NFR BLD AUTO: 68.7 %
NRBC # BLD AUTO: 0 10*3/UL
NRBC BLD AUTO-RTO: 0 /100
OPIATES UR QL SCN: ABNORMAL NG/ML
OXYCODONE UR QL SCN: NOT DETECTED NG/ML
PCP UR QL SCN: NOT DETECTED NG/ML
PLATELET # BLD AUTO: 236 10E9/L (ref 150–450)
POTASSIUM SERPL-SCNC: 3.8 MMOL/L (ref 3.4–5.3)
PROPOXYPH UR QL: NOT DETECTED NG/ML
RBC # BLD AUTO: 5.16 10E12/L (ref 4.4–5.9)
SODIUM SERPL-SCNC: 134 MMOL/L (ref 133–144)
TRICYCLICS UR QL SCN: NOT DETECTED NG/ML
WBC # BLD AUTO: 8.4 10E9/L (ref 4–11)

## 2019-01-18 PROCEDURE — 25000128 H RX IP 250 OP 636: Performed by: EMERGENCY MEDICINE

## 2019-01-18 PROCEDURE — 80306 DRUG TEST PRSMV INSTRMNT: CPT | Performed by: EMERGENCY MEDICINE

## 2019-01-18 PROCEDURE — 85025 COMPLETE CBC W/AUTO DIFF WBC: CPT | Performed by: EMERGENCY MEDICINE

## 2019-01-18 PROCEDURE — 80320 DRUG SCREEN QUANTALCOHOLS: CPT | Performed by: EMERGENCY MEDICINE

## 2019-01-18 PROCEDURE — 25000132 ZZH RX MED GY IP 250 OP 250 PS 637: Performed by: EMERGENCY MEDICINE

## 2019-01-18 PROCEDURE — 96374 THER/PROPH/DIAG INJ IV PUSH: CPT | Performed by: EMERGENCY MEDICINE

## 2019-01-18 PROCEDURE — 99285 EMERGENCY DEPT VISIT HI MDM: CPT | Mod: Z6 | Performed by: EMERGENCY MEDICINE

## 2019-01-18 PROCEDURE — 80048 BASIC METABOLIC PNL TOTAL CA: CPT | Performed by: EMERGENCY MEDICINE

## 2019-01-18 PROCEDURE — 99285 EMERGENCY DEPT VISIT HI MDM: CPT | Mod: 25 | Performed by: EMERGENCY MEDICINE

## 2019-01-18 RX ORDER — LORAZEPAM 2 MG/ML
1 INJECTION INTRAMUSCULAR ONCE
Status: COMPLETED | OUTPATIENT
Start: 2019-01-18 | End: 2019-01-18

## 2019-01-18 RX ORDER — OLANZAPINE 2.5 MG/1
5 TABLET, FILM COATED ORAL ONCE
Status: COMPLETED | OUTPATIENT
Start: 2019-01-18 | End: 2019-01-18

## 2019-01-18 RX ORDER — CLONIDINE HYDROCHLORIDE 0.1 MG/1
0.1 TABLET ORAL ONCE
Status: COMPLETED | OUTPATIENT
Start: 2019-01-18 | End: 2019-01-18

## 2019-01-18 RX ADMIN — LORAZEPAM 1 MG: 2 INJECTION INTRAMUSCULAR; INTRAVENOUS at 07:36

## 2019-01-18 RX ADMIN — OLANZAPINE 5 MG: 2.5 TABLET, FILM COATED ORAL at 09:47

## 2019-01-18 RX ADMIN — CLONIDINE HYDROCHLORIDE 0.1 MG: 0.1 TABLET ORAL at 07:36

## 2019-01-18 ASSESSMENT — ENCOUNTER SYMPTOMS
AGITATION: 1
SHORTNESS OF BREATH: 0
DIARRHEA: 0
FATIGUE: 1
MYALGIAS: 1
ENDOCRINE NEGATIVE: 1
ACTIVITY CHANGE: 0
EYES NEGATIVE: 1
NERVOUS/ANXIOUS: 1
APPETITE CHANGE: 0
HYPERACTIVE: 0
CHILLS: 1
DECREASED CONCENTRATION: 1
PALPITATIONS: 0
CHEST TIGHTNESS: 0
SLEEP DISTURBANCE: 1
RESPIRATORY NEGATIVE: 1
HALLUCINATIONS: 0
NAUSEA: 0
FEVER: 0
NEUROLOGICAL NEGATIVE: 1

## 2019-01-18 NOTE — DISCHARGE INSTRUCTIONS
Discharged with intent to travel by private car to [facility] in 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

## 2019-01-18 NOTE — PROGRESS NOTES
Talked with SCOTT Mohr in the ED regarding detox resources for patient.  Provided number for [facility] in [place].    ANSLEY Gardner  [facility] [phone]/ [facility] [phone]

## 2019-01-18 NOTE — ED NOTES
Pt and S/O, marcia, agreeable to plan of discharging and privately driving pt to [facility]. [facility] states they will have a bed available at 3. [facility] will fax pt information to [phone].

## 2019-01-18 NOTE — ED NOTES
"Pts girlfriend has talked pt into going to detox. Pt still reluctant stating \"they'll just take me and lock me in a room! I'm not going for long.\" Pt verbally aggressive to writer. Writer informed pt this was not appropriate. Writer continues to call multiple locations for treatment/detox without any success yet.  "

## 2019-01-18 NOTE — ED NOTES
"Writer contacted [facility] for a bed. [facility] states they will have discharges today and pt was placed on the list. Danae, Girlfriend would like for pt to not go to [facility] d/t only offering \"comfort medications.\" Writer will call [facility] again as multiple places do not have detox.   "

## 2019-01-18 NOTE — ED TRIAGE NOTES
Patient states he has been a long time opiate user for about 8 years.  His last dose (Smoked) of Heroine was about 18 hours ago.  Currently patient is very restless and anxious.  Patient is also experiencing insomnia.  Denies Nausea and Vomiting.  Has had some diarrhea.  Patient states he wants help, has a support person with him.  Patient is also IDDM.

## 2019-01-18 NOTE — ED PROVIDER NOTES
History     Chief Complaint   Patient presents with     Withdrawal     Heroine     HPI  Joaquim Lange is a 23 year old male who presents with concerns for acute narcotic withdrawal.  Patient reports he has been a user of heroin for the last 8 years.  States he typically takes heroin 3 times daily to avoid withdrawal symptoms.  Last use was 18 hours ago.  States he is feeling tremulous, shaky.  Denies headache, chest discomfort, shortness of breath.  Has been diaphoretic.  Very anxious.  Brought in by significant other Jenna.  She was unaware that he was using heroin.  Patient denies alcohol use, benzodiazepine use, methamphetamine use.  States he used to take narcotic tablets did recent years switched to heroin.  He presents requesting placement for inpatient chemical dependency evaluation and treatment if possible.  Reports that he has no health insurance at this time.  History for DM type I.    Patient is known to have insulin-dependent diabetes mellitus.  States that despite without having health insurance has been getting insulin through his father.  Currently using Lantus 20-25 units at bedtime.  Does do carb counting and uses NovoLog 1 unit for every 10 g of carbs with meals and 1 additional unit if he is over 150 on his glucose monitor.  His typical coverage is anywhere from 12-15 units of NovoLog with each dosing which is anywhere from 3-5 times per day.    Patient acknowledges has been unemployed for the last year and a half.    Allergies:  Allergies   Allergen Reactions     Acetaminophen      Can't take due to diabetes     Ibuprofen      Can't take due to diabetes     No Known Drug Allergies        Problem List:    Patient Active Problem List    Diagnosis Date Noted     Type 1 diabetes mellitus with hyperglycemia (H) 10/01/2015     Priority: Medium     CARDIOVASCULAR SCREENING; LDL GOAL LESS THAN 100 10/01/2015     Priority: Medium        Past Medical History:    Past Medical History:   Diagnosis  "Date     Type I (juvenile type) diabetes mellitus without mention of complication, not stated as uncontrolled 07/03/02       Past Surgical History:    Past Surgical History:   Procedure Laterality Date     NO HISTORY OF SURGERY         Family History:    Family History   Problem Relation Age of Onset     Diabetes Father         Type I since childhood     Diabetes Maternal Aunt         Type II       Social History:  Marital Status:  Single [1]  Social History     Tobacco Use     Smoking status: Current Every Day Smoker     Packs/day: 0.75     Years: 3.00     Pack years: 2.25     Types: Cigarettes     Smokeless tobacco: Never Used     Tobacco comment: started age 17   Substance Use Topics     Alcohol use: No     Alcohol/week: 0.0 oz     Drug use: Yes     Types: Marijuana     Comment: not recently        Medications:      blood glucose monitoring (ACCU-CHEK MULTICLIX) lancets   blood glucose monitoring (JIA CONTOUR MONITOR) meter device kit   blood glucose monitoring (NO BRAND SPECIFIED) meter device kit   blood glucose monitoring (NO BRAND SPECIFIED) test strip   insulin aspart (NOVOLOG PEN) 100 UNIT/ML pen   insulin glargine (LANTUS SOLOSTAR) 100 UNIT/ML injection   acetaminophen (TYLENOL) 500 MG tablet   IBUPROFEN PO   insulin glargine (BASAGLAR KWIKPEN) 100 UNIT/ML injection   insulin lispro (HUMALOG KWIKPEN) 100 UNIT/ML injection   Insulin Pen Needle (PEN NEEDLES 1/2\") 29G X 12MM MISC   order for DME         Review of Systems   Constitutional: Positive for chills and fatigue. Negative for activity change, appetite change and fever.   HENT: Negative.    Eyes: Negative.    Respiratory: Negative.  Negative for chest tightness and shortness of breath.    Cardiovascular: Negative for chest pain and palpitations.   Gastrointestinal: Negative for diarrhea and nausea.   Endocrine: Negative.    Genitourinary: Negative.    Musculoskeletal: Positive for myalgias.   Neurological: Negative.    Psychiatric/Behavioral: " Positive for agitation, decreased concentration and sleep disturbance. Negative for hallucinations. The patient is nervous/anxious. The patient is not hyperactive.    All other systems reviewed and are negative.      Physical Exam   BP: (!) 164/100  Heart Rate: 98  Temp: 97.1  F (36.2  C)  Resp: 20  SpO2: 100 %      Physical Exam   Constitutional: He is oriented to person, place, and time. He appears well-developed and well-nourished. No distress.   HENT:   Head: Normocephalic and atraumatic.   Mouth/Throat: Oropharynx is clear and moist.   Eyes: EOM are normal. Pupils are equal, round, and reactive to light. No scleral icterus.   Neck: Normal range of motion. Neck supple.   Cardiovascular: Normal rate and regular rhythm.   Neurological: He is alert and oriented to person, place, and time.   Skin: Skin is warm. No rash noted. He is diaphoretic.   Nursing note and vitals reviewed.      ED Course        Procedures                Results for orders placed or performed during the hospital encounter of 01/18/19   CBC with platelets differential   Result Value Ref Range    WBC 8.4 4.0 - 11.0 10e9/L    RBC Count 5.16 4.4 - 5.9 10e12/L    Hemoglobin 14.8 13.3 - 17.7 g/dL    Hematocrit 43.4 40.0 - 53.0 %    MCV 84 78 - 100 fl    MCH 28.7 26.5 - 33.0 pg    MCHC 34.1 31.5 - 36.5 g/dL    RDW 12.1 10.0 - 15.0 %    Platelet Count 236 150 - 450 10e9/L    Diff Method Automated Method     % Neutrophils 68.7 %    % Lymphocytes 20.6 %    % Monocytes 8.4 %    % Eosinophils 1.4 %    % Basophils 0.5 %    % Immature Granulocytes 0.4 %    Nucleated RBCs 0 0 /100    Absolute Neutrophil 5.8 1.6 - 8.3 10e9/L    Absolute Lymphocytes 1.7 0.8 - 5.3 10e9/L    Absolute Monocytes 0.7 0.0 - 1.3 10e9/L    Absolute Basophils 0.0 0.0 - 0.2 10e9/L    Abs Immature Granulocytes 0.0 0 - 0.4 10e9/L    Absolute Nucleated RBC 0.0    Basic metabolic panel   Result Value Ref Range    Sodium 134 133 - 144 mmol/L    Potassium 3.8 3.4 - 5.3 mmol/L    Chloride 99  94 - 109 mmol/L    Carbon Dioxide 27 20 - 32 mmol/L    Anion Gap 8 3 - 14 mmol/L    Glucose 421 (H) 70 - 99 mg/dL    Urea Nitrogen 9 7 - 30 mg/dL    Creatinine 0.71 0.66 - 1.25 mg/dL    GFR Estimate >90 >60 mL/min/[1.73_m2]    GFR Estimate If Black >90 >60 mL/min/[1.73_m2]    Calcium 8.5 8.5 - 10.1 mg/dL   Alcohol ethyl   Result Value Ref Range    Ethanol g/dL <0.01 <0.01 g/dL   Urine Drugs of Abuse Screen Panel 13   Result Value Ref Range    Cannabinoids (14-prg-2-carboxy-9-THC) Not Detected NDET^Not Detected ng/mL    Phencyclidine (Phencyclidine) Not Detected NDET^Not Detected ng/mL    Cocaine (Benzoylecgonine) Not Detected NDET^Not Detected ng/mL    Methamphetamine (d-Methamphetamine) Not Detected NDET^Not Detected ng/mL    Opiates (Morphine) Detected, Abnormal Result (A) NDET^Not Detected ng/mL    Amphetamine (d-Amphetamine) Detected, Abnormal Result (A) NDET^Not Detected ng/mL    Benzodiazepines (Nordiazepam) Not Detected NDET^Not Detected ng/mL    Tricyclic Antidepressants (Desipramine) Not Detected NDET^Not Detected ng/mL    Methadone (Methadone) Not Detected NDET^Not Detected ng/mL    Barbiturates (Butalbital) Not Detected NDET^Not Detected ng/mL    Oxycodone (Oxycodone) Not Detected NDET^Not Detected ng/mL    Propoxyphene (Norpropoxyphene) Not Detected NDET^Not Detected ng/mL    Buprenorphine (Buprenorphine) Not Detected NDET^Not Detected ng/mL         No results found for this or any previous visit (from the past 24 hour(s)).    Medications   cloNIDine (CATAPRES) tablet 0.1 mg (0.1 mg Oral Given 19)   LORazepam (ATIVAN) injection 1 mg (1 mg Intravenous Given 19)       Assessments & Plan (with Medical Decision Makin:55 AM  23-year-old male with IDDM presents with acute narcotic withdrawal.  8-year history for daily heavy narcotic use initially started on prescription narcotic pills and in recent years was switched to heroin.  Using heroin 3 times per day.  Last dose was 18 hours  "ago.  Presents with hypertension, diaphoresis, agitation and tachycardia.  Is not having any GI symptoms.  Has not gone without his insulin.  Plan: Screening labs and urine toxicology screen.  Call was placed to [facility].  They have no available beds.  Numerous other places call do not except because of no insurance.  Possible placement at [facility].  They do not offer Suboxone, Subutex, methadone.  Local facilities in [facility] include [facility] and [facility].  These are outpatient programs.  There are for males.  Were calling to determine if they have any available slots.  8:25 AM  Many calls were placed locations for possible treatment center options.  The only one that is a possibility would be [facility].  We will not home at 11 AM.  All the rest denied because patient does not have insurance.  They recommended and contacting social workers through his [facility] which I believe is [place] and try to obtain a \"rule 25\".  9:31 AM  Patient is now willing to consider detox.  No treatment center options without insurance.  Is significant other Jenna will be contacting the [facility]  to determine if he can be qualified under rule 25.  If he changes his mind and would like to consider detox he can return to the emergency department.  Before leaving he was angry when we brought up the notion of considering detox.  Monitor report is IV.  His pulse before he could do so.  Not refusing any other treatment or attempts to intervene.  9:39 AM  After IV was pulled patient then was given an ultimatum by his significant other valve.  He is now willing to consider detox.  Uncertain if we will find placement for narcotic withdrawal.  Increased agitation that is not benefited by benzodiazepine.  Ordered Zyprexa 5 mg p.o.  11:25 AM  Patient sleeping comfortably.  Easily aroused.  Much more calm after Zyprexa 5 mg dose.  We did find placement admission treatment center in [place].  " His significant other is willing to drive him by private car.  Patient is going on a voluntary basis.  Copy of this report will be sent to [facility] for their review and to complement her medical records.  Medically cleared for placement at the [facility].     I have reviewed the nursing notes.    I have reviewed the findings, diagnosis, plan and need for follow up with the patient.         Medication List      There are no discharge medications for this visit.         Final diagnoses:   Acute narcotic withdrawal (H)       1/18/2019   [facility] EMERGENCY DEPARTMENT     Adolfo Wiggins, DO  01/18/19 0932       Adolfo Wiggins, DO  01/18/19 1126

## 2019-01-18 NOTE — ED AVS SNAPSHOT
[facility] Emergency Department  [address] [place] [address]  Phone:  [phone]  Fax:  [phone]                                    Joaquim Lange   MRN: [id number]    Department:  [facility] Emergency Department   Date of Visit:  1/18/2019           After Visit Summary Signature Page    I have received my discharge instructions, and my questions have been answered. I have discussed any challenges I see with this plan with the nurse or doctor.    ..........................................................................................................................................  Patient/Patient Representative Signature      ..........................................................................................................................................  Patient Representative Print Name and Relationship to Patient    ..................................................               ................................................  Date                                   Time    ..........................................................................................................................................  Reviewed by Signature/Title    ...................................................              ..............................................  Date                                               Time          22EPIC Rev 08/18

## 2019-01-18 NOTE — ED NOTES
Pt and S/O updated on detox/treatment status. Writer has been unsuccessful in attempts to get in contact with any available facility. Will attempt to call [facility] at 1100 and [facility] at 1200 again as requested.

## 2019-01-29 ENCOUNTER — OFFICE VISIT (OUTPATIENT)
Dept: FAMILY MEDICINE | Facility: OTHER | Age: 24
End: 2019-01-29

## 2019-01-29 VITALS
OXYGEN SATURATION: 100 % | DIASTOLIC BLOOD PRESSURE: 78 MMHG | WEIGHT: 178.6 LBS | HEART RATE: 114 BPM | TEMPERATURE: 99.4 F | BODY MASS INDEX: 25.57 KG/M2 | RESPIRATION RATE: 16 BRPM | HEIGHT: 70 IN | SYSTOLIC BLOOD PRESSURE: 128 MMHG

## 2019-01-29 DIAGNOSIS — E10.65 TYPE 1 DIABETES MELLITUS WITH HYPERGLYCEMIA (H): Primary | ICD-10-CM

## 2019-01-29 DIAGNOSIS — F11.20 HEROIN ADDICTION (H): ICD-10-CM

## 2019-01-29 PROCEDURE — 99213 OFFICE O/P EST LOW 20 MIN: CPT | Performed by: INTERNAL MEDICINE

## 2019-01-29 ASSESSMENT — PAIN SCALES - GENERAL: PAINLEVEL: NO PAIN (0)

## 2019-01-29 ASSESSMENT — MIFFLIN-ST. JEOR: SCORE: 1816.37

## 2019-01-29 NOTE — PROGRESS NOTES
"Chief Complaint   Patient presents with     Addiction     would like to discuss shot for heroin addiction                     Chief Complaint         The patient is a 23-year-old gentleman who has a history of a 5-year heroin addiction.  He is here today because his girlfriend him receive Vivitrol.  My understanding is that this is a long acting/depo naltrexone.  The patient has a drug counselor who has recommended another form of therapy.  The patient is refusing Suboxone as he \"does not want to get addicted\".  I explained to him (he who has not used heroin in the last 2 days, and is already antsy and thrashing the chair and easily distractible) that an injection type time would be very unpleasant.  I have recommended the [facility] addiction clinic and he suggests that he has alternate plan.  With respect to his type 1 diabetes, he has been getting his insulin through alternate channels but states that he has been using the Lantus and NovoLog regularly.  He notes that he follows a sliding scale.  He does not wish to allow any blood work including an A1c at this time.  I have recommended that he follows up with and he returns here if and when he chooses to treat the diabetes.  I have given him a prescription for insulin.  Apparently, he has no difficulty obtaining the  test strips etc.  He states that he will make an appointment in the future for follow-up of his diabetes.  But that day is not today.                                         Decision Making      1. Type 1 diabetes mellitus with hyperglycemia (H)    - insulin glargine (LANTUS SOLOSTAR PEN) 100 UNIT/ML pen; Inject 20 Units Subcutaneous At Bedtime  Dispense: 15 mL; Refill: 0  - insulin aspart (NOVOLOG PEN) 100 UNIT/ML pen; Per patient's sliding scale  Dispense: 15 mL; Refill: 3    2. Heroin addiction (H)      Fidel  "

## 2019-03-03 DIAGNOSIS — E10.65 TYPE 1 DIABETES MELLITUS WITH HYPERGLYCEMIA (H): ICD-10-CM

## 2019-03-05 NOTE — TELEPHONE ENCOUNTER
Routing refill request to provider for review/approval because:  A break in medication, last testing supplies were ordered in 2015    ADDISON GonzalezN, RN  [facility]

## 2019-03-05 NOTE — TELEPHONE ENCOUNTER
"Requested Prescriptions   Pending Prescriptions Disp Refills     Insulin Pen Needle (PEN NEEDLES \") 29G X 12MM MISC 100 each 3     Si Device 4 times daily    Diabetic Supplies Protocol Passed - 3/3/2019  2:08 PM       Passed - Medication is active on med list       Passed - Patient is 18 years of age or older       Passed - Recent (6 mo) or future (30 days) visit within the authorizing provider's specialty    Patient had office visit in the last 6 months or has a visit in the next 30 days with authorizing provider.  See \"Patient Info\" tab in inbasket, or \"Choose Columns\" in Meds & Orders section of the refill encounter.            blood glucose monitoring (ACCU-CHEK MULTICLIX) lancets       Sig: Use to test blood sugar 3 times daily or as directed.    Diabetic Supplies Protocol Passed - 3/3/2019  2:08 PM       Passed - Medication is active on med list       Passed - Patient is 18 years of age or older       Passed - Recent (6 mo) or future (30 days) visit within the authorizing provider's specialty    Patient had office visit in the last 6 months or has a visit in the next 30 days with authorizing provider.  See \"Patient Info\" tab in inbasket, or \"Choose Columns\" in Meds & Orders section of the refill encounter.            blood glucose monitoring (JIA CONTOUR MONITOR) meter device kit 1 kit 0     Sig: Use to test blood sugars 2 times daily or as directed.    Diabetic Supplies Protocol Passed - 3/3/2019  2:08 PM       Passed - Medication is active on med list       Passed - Patient is 18 years of age or older       Passed - Recent (6 mo) or future (30 days) visit within the authorizing provider's specialty    Patient had office visit in the last 6 months or has a visit in the next 30 days with authorizing provider.  See \"Patient Info\" tab in inbasket, or \"Choose Columns\" in Meds & Orders section of the refill encounter.            blood glucose (NO BRAND SPECIFIED) test strip 1 Box 5     Sig: Use to test " "blood sugars 3 times daily or as directed    Diabetic Supplies Protocol Passed - 3/3/2019  2:08 PM       Passed - Medication is active on med list       Passed - Patient is 18 years of age or older       Passed - Recent (6 mo) or future (30 days) visit within the authorizing provider's specialty    Patient had office visit in the last 6 months or has a visit in the next 30 days with authorizing provider.  See \"Patient Info\" tab in inbasket, or \"Choose Columns\" in Meds & Orders section of the refill encounter.            "